# Patient Record
Sex: MALE | Race: BLACK OR AFRICAN AMERICAN | Employment: UNEMPLOYED | URBAN - METROPOLITAN AREA
[De-identification: names, ages, dates, MRNs, and addresses within clinical notes are randomized per-mention and may not be internally consistent; named-entity substitution may affect disease eponyms.]

---

## 2020-07-16 ENCOUNTER — APPOINTMENT (OUTPATIENT)
Dept: GENERAL RADIOLOGY | Age: 60
DRG: 377 | End: 2020-07-16
Attending: EMERGENCY MEDICINE
Payer: SUBSIDIZED

## 2020-07-16 ENCOUNTER — HOSPITAL ENCOUNTER (INPATIENT)
Age: 60
LOS: 6 days | Discharge: HOME | DRG: 377 | End: 2020-07-22
Attending: EMERGENCY MEDICINE | Admitting: HOSPITALIST
Payer: SUBSIDIZED

## 2020-07-16 DIAGNOSIS — D64.9 SYMPTOMATIC ANEMIA: Primary | ICD-10-CM

## 2020-07-16 PROBLEM — R10.9 ABDOMINAL PAIN: Status: ACTIVE | Noted: 2020-07-16

## 2020-07-16 LAB
ALBUMIN SERPL-MCNC: 4.2 G/DL (ref 3.2–4.6)
ALBUMIN/GLOB SERPL: 0.9 {RATIO} (ref 1.2–3.5)
ALP SERPL-CCNC: 80 U/L (ref 50–136)
ALT SERPL-CCNC: 47 U/L (ref 12–65)
ANION GAP SERPL CALC-SCNC: 12 MMOL/L (ref 7–16)
APTT PPP: 25 SEC (ref 24.3–35.4)
AST SERPL-CCNC: 57 U/L (ref 15–37)
BASOPHILS # BLD: 0.1 K/UL (ref 0–0.2)
BASOPHILS NFR BLD: 1 % (ref 0–2)
BILIRUB SERPL-MCNC: 0.4 MG/DL (ref 0.2–1.1)
BUN SERPL-MCNC: 17 MG/DL (ref 8–23)
CALCIUM SERPL-MCNC: 9.1 MG/DL (ref 8.3–10.4)
CHLORIDE SERPL-SCNC: 103 MMOL/L (ref 98–107)
CO2 SERPL-SCNC: 20 MMOL/L (ref 21–32)
CREAT SERPL-MCNC: 1.44 MG/DL (ref 0.8–1.5)
DIFFERENTIAL METHOD BLD: ABNORMAL
EOSINOPHIL # BLD: 0.2 K/UL (ref 0–0.8)
EOSINOPHIL NFR BLD: 2 % (ref 0.5–7.8)
ERYTHROCYTE [DISTWIDTH] IN BLOOD BY AUTOMATED COUNT: 23 % (ref 11.9–14.6)
ETHANOL SERPL-MCNC: 365 MG/DL
GLOBULIN SER CALC-MCNC: 4.8 G/DL (ref 2.3–3.5)
GLUCOSE SERPL-MCNC: 106 MG/DL (ref 65–100)
HCT VFR BLD AUTO: 21.5 % (ref 41.1–50.3)
HGB BLD-MCNC: 5.9 G/DL (ref 13.6–17.2)
IMM GRANULOCYTES # BLD AUTO: 0 K/UL (ref 0–0.5)
IMM GRANULOCYTES NFR BLD AUTO: 0 % (ref 0–5)
INR PPP: 1
LIPASE SERPL-CCNC: 398 U/L (ref 73–393)
LYMPHOCYTES # BLD: 3.4 K/UL (ref 0.5–4.6)
LYMPHOCYTES NFR BLD: 39 % (ref 13–44)
MAGNESIUM SERPL-MCNC: 2.5 MG/DL (ref 1.8–2.4)
MCH RBC QN AUTO: 16 PG (ref 26.1–32.9)
MCHC RBC AUTO-ENTMCNC: 27.4 G/DL (ref 31.4–35)
MCV RBC AUTO: 58.3 FL (ref 79.6–97.8)
MONOCYTES # BLD: 1 K/UL (ref 0.1–1.3)
MONOCYTES NFR BLD: 11 % (ref 4–12)
NEUTS SEG # BLD: 4 K/UL (ref 1.7–8.2)
NEUTS SEG NFR BLD: 47 % (ref 43–78)
NRBC # BLD: 0.04 K/UL (ref 0–0.2)
PHOSPHATE SERPL-MCNC: 4 MG/DL (ref 2.3–3.7)
PLATELET # BLD AUTO: 175 K/UL (ref 150–450)
PMV BLD AUTO: ABNORMAL FL (ref 9.4–12.3)
POTASSIUM SERPL-SCNC: 4 MMOL/L (ref 3.5–5.1)
PROT SERPL-MCNC: 9 G/DL (ref 6.3–8.2)
PROTHROMBIN TIME: 13.8 SEC (ref 12–14.7)
RBC # BLD AUTO: 3.69 M/UL (ref 4.23–5.6)
SODIUM SERPL-SCNC: 135 MMOL/L (ref 136–145)
TROPONIN-HIGH SENSITIVITY: 7.3 PG/ML (ref 0–14)
WBC # BLD AUTO: 8.7 K/UL (ref 4.3–11.1)

## 2020-07-16 PROCEDURE — 74011000258 HC RX REV CODE- 258: Performed by: HOSPITALIST

## 2020-07-16 PROCEDURE — 93005 ELECTROCARDIOGRAM TRACING: CPT | Performed by: EMERGENCY MEDICINE

## 2020-07-16 PROCEDURE — 65660000000 HC RM CCU STEPDOWN

## 2020-07-16 PROCEDURE — 84100 ASSAY OF PHOSPHORUS: CPT

## 2020-07-16 PROCEDURE — 83735 ASSAY OF MAGNESIUM: CPT

## 2020-07-16 PROCEDURE — 86923 COMPATIBILITY TEST ELECTRIC: CPT

## 2020-07-16 PROCEDURE — 99284 EMERGENCY DEPT VISIT MOD MDM: CPT

## 2020-07-16 PROCEDURE — 85025 COMPLETE CBC W/AUTO DIFF WBC: CPT

## 2020-07-16 PROCEDURE — 71046 X-RAY EXAM CHEST 2 VIEWS: CPT

## 2020-07-16 PROCEDURE — 85730 THROMBOPLASTIN TIME PARTIAL: CPT

## 2020-07-16 PROCEDURE — 83690 ASSAY OF LIPASE: CPT

## 2020-07-16 PROCEDURE — 74011250636 HC RX REV CODE- 250/636: Performed by: HOSPITALIST

## 2020-07-16 PROCEDURE — 84484 ASSAY OF TROPONIN QUANT: CPT

## 2020-07-16 PROCEDURE — 80053 COMPREHEN METABOLIC PANEL: CPT

## 2020-07-16 PROCEDURE — 85610 PROTHROMBIN TIME: CPT

## 2020-07-16 PROCEDURE — 74011000250 HC RX REV CODE- 250: Performed by: EMERGENCY MEDICINE

## 2020-07-16 PROCEDURE — 80307 DRUG TEST PRSMV CHEM ANLYZR: CPT

## 2020-07-16 PROCEDURE — C9113 INJ PANTOPRAZOLE SODIUM, VIA: HCPCS | Performed by: EMERGENCY MEDICINE

## 2020-07-16 PROCEDURE — 86900 BLOOD TYPING SEROLOGIC ABO: CPT

## 2020-07-16 PROCEDURE — 74011250636 HC RX REV CODE- 250/636: Performed by: EMERGENCY MEDICINE

## 2020-07-16 PROCEDURE — 65270000029 HC RM PRIVATE

## 2020-07-16 RX ORDER — SODIUM CHLORIDE 9 MG/ML
250 INJECTION, SOLUTION INTRAVENOUS AS NEEDED
Status: DISCONTINUED | OUTPATIENT
Start: 2020-07-16 | End: 2020-07-22 | Stop reason: HOSPADM

## 2020-07-16 RX ADMIN — SODIUM CHLORIDE 40 MG: 9 INJECTION, SOLUTION INTRAMUSCULAR; INTRAVENOUS; SUBCUTANEOUS at 23:21

## 2020-07-16 RX ADMIN — THIAMINE HYDROCHLORIDE 100 MG: 100 INJECTION, SOLUTION INTRAMUSCULAR; INTRAVENOUS at 23:23

## 2020-07-17 ENCOUNTER — ANESTHESIA EVENT (OUTPATIENT)
Dept: ENDOSCOPY | Age: 60
DRG: 377 | End: 2020-07-17
Payer: SUBSIDIZED

## 2020-07-17 ENCOUNTER — ANESTHESIA (OUTPATIENT)
Dept: ENDOSCOPY | Age: 60
DRG: 377 | End: 2020-07-17
Payer: SUBSIDIZED

## 2020-07-17 ENCOUNTER — APPOINTMENT (OUTPATIENT)
Dept: ULTRASOUND IMAGING | Age: 60
DRG: 377 | End: 2020-07-17
Attending: HOSPITALIST
Payer: SUBSIDIZED

## 2020-07-17 PROBLEM — F10.20 ALCOHOL DEPENDENCY (HCC): Status: ACTIVE | Noted: 2020-07-17

## 2020-07-17 PROBLEM — F10.929 ALCOHOL INTOXICATION (HCC): Status: ACTIVE | Noted: 2020-07-17

## 2020-07-17 LAB
ALBUMIN SERPL-MCNC: 3.6 G/DL (ref 3.2–4.6)
ALBUMIN/GLOB SERPL: 0.8 {RATIO} (ref 1.2–3.5)
ALP SERPL-CCNC: 72 U/L (ref 50–136)
ALT SERPL-CCNC: 43 U/L (ref 12–65)
ANION GAP SERPL CALC-SCNC: 10 MMOL/L (ref 7–16)
AST SERPL-CCNC: 49 U/L (ref 15–37)
ATRIAL RATE: 101 BPM
BASOPHILS # BLD: 0.1 K/UL (ref 0–0.2)
BASOPHILS NFR BLD: 1 % (ref 0–2)
BILIRUB SERPL-MCNC: 0.8 MG/DL (ref 0.2–1.1)
BUN SERPL-MCNC: 13 MG/DL (ref 8–23)
CALCIUM SERPL-MCNC: 8.9 MG/DL (ref 8.3–10.4)
CALCULATED P AXIS, ECG09: 62 DEGREES
CALCULATED R AXIS, ECG10: -11 DEGREES
CALCULATED T AXIS, ECG11: 48 DEGREES
CHLORIDE SERPL-SCNC: 110 MMOL/L (ref 98–107)
CO2 SERPL-SCNC: 21 MMOL/L (ref 21–32)
CREAT SERPL-MCNC: 1.23 MG/DL (ref 0.8–1.5)
DIAGNOSIS, 93000: NORMAL
DIFFERENTIAL METHOD BLD: ABNORMAL
EOSINOPHIL # BLD: 0.1 K/UL (ref 0–0.8)
EOSINOPHIL NFR BLD: 1 % (ref 0.5–7.8)
ERYTHROCYTE [DISTWIDTH] IN BLOOD BY AUTOMATED COUNT: 27.8 % (ref 11.9–14.6)
ERYTHROCYTE [DISTWIDTH] IN BLOOD BY AUTOMATED COUNT: 28.5 % (ref 11.9–14.6)
GLOBULIN SER CALC-MCNC: 4.3 G/DL (ref 2.3–3.5)
GLUCOSE SERPL-MCNC: 95 MG/DL (ref 65–100)
HCT VFR BLD AUTO: 25.2 % (ref 41.1–50.3)
HCT VFR BLD AUTO: 27.3 % (ref 41.1–50.3)
HGB BLD-MCNC: 7.4 G/DL (ref 13.6–17.2)
HGB BLD-MCNC: 8.1 G/DL (ref 13.6–17.2)
IMM GRANULOCYTES # BLD AUTO: 0 K/UL (ref 0–0.5)
IMM GRANULOCYTES NFR BLD AUTO: 0 % (ref 0–5)
LYMPHOCYTES # BLD: 1.6 K/UL (ref 0.5–4.6)
LYMPHOCYTES NFR BLD: 20 % (ref 13–44)
MCH RBC QN AUTO: 18.5 PG (ref 26.1–32.9)
MCH RBC QN AUTO: 18.6 PG (ref 26.1–32.9)
MCHC RBC AUTO-ENTMCNC: 29.4 G/DL (ref 31.4–35)
MCHC RBC AUTO-ENTMCNC: 29.7 G/DL (ref 31.4–35)
MCV RBC AUTO: 62.5 FL (ref 79.6–97.8)
MCV RBC AUTO: 63.3 FL (ref 79.6–97.8)
MONOCYTES # BLD: 0.9 K/UL (ref 0.1–1.3)
MONOCYTES NFR BLD: 12 % (ref 4–12)
NEUTS SEG # BLD: 5.2 K/UL (ref 1.7–8.2)
NEUTS SEG NFR BLD: 66 % (ref 43–78)
NRBC # BLD: 0.02 K/UL (ref 0–0.2)
NRBC # BLD: 0.02 K/UL (ref 0–0.2)
P-R INTERVAL, ECG05: 142 MS
PLATELET # BLD AUTO: 162 K/UL (ref 150–450)
PLATELET # BLD AUTO: 178 K/UL (ref 150–450)
PMV BLD AUTO: ABNORMAL FL (ref 9.4–12.3)
PMV BLD AUTO: ABNORMAL FL (ref 9.4–12.3)
POTASSIUM SERPL-SCNC: 4.2 MMOL/L (ref 3.5–5.1)
PROT SERPL-MCNC: 7.9 G/DL (ref 6.3–8.2)
Q-T INTERVAL, ECG07: 342 MS
QRS DURATION, ECG06: 84 MS
QTC CALCULATION (BEZET), ECG08: 443 MS
RBC # BLD AUTO: 3.98 M/UL (ref 4.23–5.6)
RBC # BLD AUTO: 4.37 M/UL (ref 4.23–5.6)
SODIUM SERPL-SCNC: 141 MMOL/L (ref 136–145)
VENTRICULAR RATE, ECG03: 101 BPM
WBC # BLD AUTO: 6.2 K/UL (ref 4.3–11.1)
WBC # BLD AUTO: 7.7 K/UL (ref 4.3–11.1)

## 2020-07-17 PROCEDURE — 36415 COLL VENOUS BLD VENIPUNCTURE: CPT

## 2020-07-17 PROCEDURE — 74011000250 HC RX REV CODE- 250: Performed by: NURSE ANESTHETIST, CERTIFIED REGISTERED

## 2020-07-17 PROCEDURE — 77030008969: Performed by: INTERNAL MEDICINE

## 2020-07-17 PROCEDURE — P9016 RBC LEUKOCYTES REDUCED: HCPCS

## 2020-07-17 PROCEDURE — 74011250636 HC RX REV CODE- 250/636: Performed by: HOSPITALIST

## 2020-07-17 PROCEDURE — 77030022875 HC PRB AR PLSM COAG ERBE -C: Performed by: INTERNAL MEDICINE

## 2020-07-17 PROCEDURE — 85025 COMPLETE CBC W/AUTO DIFF WBC: CPT

## 2020-07-17 PROCEDURE — 0W3P8ZZ CONTROL BLEEDING IN GASTROINTESTINAL TRACT, VIA NATURAL OR ARTIFICIAL OPENING ENDOSCOPIC: ICD-10-PCS | Performed by: INTERNAL MEDICINE

## 2020-07-17 PROCEDURE — 76705 ECHO EXAM OF ABDOMEN: CPT

## 2020-07-17 PROCEDURE — 85027 COMPLETE CBC AUTOMATED: CPT

## 2020-07-17 PROCEDURE — C9113 INJ PANTOPRAZOLE SODIUM, VIA: HCPCS | Performed by: HOSPITALIST

## 2020-07-17 PROCEDURE — 77030040361 HC SLV COMPR DVT MDII -B

## 2020-07-17 PROCEDURE — 76060000032 HC ANESTHESIA 0.5 TO 1 HR: Performed by: INTERNAL MEDICINE

## 2020-07-17 PROCEDURE — 74011250636 HC RX REV CODE- 250/636: Performed by: NURSE ANESTHETIST, CERTIFIED REGISTERED

## 2020-07-17 PROCEDURE — 80053 COMPREHEN METABOLIC PANEL: CPT

## 2020-07-17 PROCEDURE — 74011000258 HC RX REV CODE- 258: Performed by: HOSPITALIST

## 2020-07-17 PROCEDURE — 74011250637 HC RX REV CODE- 250/637: Performed by: INTERNAL MEDICINE

## 2020-07-17 PROCEDURE — 76040000025: Performed by: INTERNAL MEDICINE

## 2020-07-17 PROCEDURE — 36430 TRANSFUSION BLD/BLD COMPNT: CPT

## 2020-07-17 PROCEDURE — 65660000000 HC RM CCU STEPDOWN

## 2020-07-17 PROCEDURE — 30233N1 TRANSFUSION OF NONAUTOLOGOUS RED BLOOD CELLS INTO PERIPHERAL VEIN, PERCUTANEOUS APPROACH: ICD-10-PCS | Performed by: EMERGENCY MEDICINE

## 2020-07-17 RX ORDER — PROPOFOL 10 MG/ML
INJECTION, EMULSION INTRAVENOUS AS NEEDED
Status: DISCONTINUED | OUTPATIENT
Start: 2020-07-17 | End: 2020-07-17 | Stop reason: HOSPADM

## 2020-07-17 RX ORDER — SODIUM CHLORIDE 0.9 % (FLUSH) 0.9 %
5-40 SYRINGE (ML) INJECTION AS NEEDED
Status: DISCONTINUED | OUTPATIENT
Start: 2020-07-17 | End: 2020-07-22 | Stop reason: HOSPADM

## 2020-07-17 RX ORDER — PROPOFOL 10 MG/ML
INJECTION, EMULSION INTRAVENOUS
Status: DISCONTINUED | OUTPATIENT
Start: 2020-07-17 | End: 2020-07-17 | Stop reason: HOSPADM

## 2020-07-17 RX ORDER — DEXTROSE MONOHYDRATE AND SODIUM CHLORIDE 5; .9 G/100ML; G/100ML
125 INJECTION, SOLUTION INTRAVENOUS CONTINUOUS
Status: DISCONTINUED | OUTPATIENT
Start: 2020-07-17 | End: 2020-07-17

## 2020-07-17 RX ORDER — SODIUM CHLORIDE 0.9 % (FLUSH) 0.9 %
5-40 SYRINGE (ML) INJECTION EVERY 8 HOURS
Status: DISCONTINUED | OUTPATIENT
Start: 2020-07-17 | End: 2020-07-22 | Stop reason: HOSPADM

## 2020-07-17 RX ORDER — GLYCOPYRROLATE 0.2 MG/ML
INJECTION INTRAMUSCULAR; INTRAVENOUS AS NEEDED
Status: DISCONTINUED | OUTPATIENT
Start: 2020-07-17 | End: 2020-07-17 | Stop reason: HOSPADM

## 2020-07-17 RX ORDER — FOLIC ACID 1 MG/1
1 TABLET ORAL DAILY
Status: DISCONTINUED | OUTPATIENT
Start: 2020-07-18 | End: 2020-07-22 | Stop reason: HOSPADM

## 2020-07-17 RX ORDER — LIDOCAINE HYDROCHLORIDE 20 MG/ML
INJECTION, SOLUTION EPIDURAL; INFILTRATION; INTRACAUDAL; PERINEURAL AS NEEDED
Status: DISCONTINUED | OUTPATIENT
Start: 2020-07-17 | End: 2020-07-17 | Stop reason: HOSPADM

## 2020-07-17 RX ORDER — LORAZEPAM 2 MG/ML
1 INJECTION INTRAMUSCULAR
Status: DISCONTINUED | OUTPATIENT
Start: 2020-07-17 | End: 2020-07-22 | Stop reason: HOSPADM

## 2020-07-17 RX ORDER — LANOLIN ALCOHOL/MO/W.PET/CERES
100 CREAM (GRAM) TOPICAL DAILY
Status: DISCONTINUED | OUTPATIENT
Start: 2020-07-18 | End: 2020-07-22 | Stop reason: HOSPADM

## 2020-07-17 RX ORDER — PANTOPRAZOLE SODIUM 40 MG/1
40 TABLET, DELAYED RELEASE ORAL
Status: DISCONTINUED | OUTPATIENT
Start: 2020-07-17 | End: 2020-07-22 | Stop reason: HOSPADM

## 2020-07-17 RX ORDER — ONDANSETRON 2 MG/ML
4 INJECTION INTRAMUSCULAR; INTRAVENOUS
Status: DISCONTINUED | OUTPATIENT
Start: 2020-07-17 | End: 2020-07-22 | Stop reason: HOSPADM

## 2020-07-17 RX ORDER — SODIUM CHLORIDE, SODIUM LACTATE, POTASSIUM CHLORIDE, CALCIUM CHLORIDE 600; 310; 30; 20 MG/100ML; MG/100ML; MG/100ML; MG/100ML
INJECTION, SOLUTION INTRAVENOUS
Status: DISCONTINUED | OUTPATIENT
Start: 2020-07-17 | End: 2020-07-17 | Stop reason: HOSPADM

## 2020-07-17 RX ADMIN — Medication 10 ML: at 01:40

## 2020-07-17 RX ADMIN — Medication 10 ML: at 06:00

## 2020-07-17 RX ADMIN — LIDOCAINE HYDROCHLORIDE 90 MG: 20 INJECTION, SOLUTION EPIDURAL; INFILTRATION; INTRACAUDAL; PERINEURAL at 12:45

## 2020-07-17 RX ADMIN — PROPOFOL 40 MG: 10 INJECTION, EMULSION INTRAVENOUS at 13:04

## 2020-07-17 RX ADMIN — SODIUM CHLORIDE, SODIUM LACTATE, POTASSIUM CHLORIDE, AND CALCIUM CHLORIDE: 600; 310; 30; 20 INJECTION, SOLUTION INTRAVENOUS at 12:42

## 2020-07-17 RX ADMIN — Medication 10 ML: at 14:00

## 2020-07-17 RX ADMIN — PANTOPRAZOLE SODIUM 40 MG: 40 TABLET, DELAYED RELEASE ORAL at 16:40

## 2020-07-17 RX ADMIN — Medication 10 ML: at 21:28

## 2020-07-17 RX ADMIN — PANTOPRAZOLE SODIUM 40 MG: 40 INJECTION, POWDER, FOR SOLUTION INTRAVENOUS at 09:17

## 2020-07-17 RX ADMIN — PROPOFOL 80 MG: 10 INJECTION, EMULSION INTRAVENOUS at 12:45

## 2020-07-17 RX ADMIN — LORAZEPAM 1 MG: 2 INJECTION INTRAMUSCULAR; INTRAVENOUS at 16:47

## 2020-07-17 RX ADMIN — GLYCOPYRROLATE 0.1 MG: 0.2 INJECTION INTRAMUSCULAR; INTRAVENOUS at 12:45

## 2020-07-17 RX ADMIN — DEXTROSE MONOHYDRATE AND SODIUM CHLORIDE 125 ML/HR: 5; .9 INJECTION, SOLUTION INTRAVENOUS at 04:12

## 2020-07-17 RX ADMIN — PROPOFOL 50 MG: 10 INJECTION, EMULSION INTRAVENOUS at 12:48

## 2020-07-17 RX ADMIN — PROPOFOL 180 MCG/KG/MIN: 10 INJECTION, EMULSION INTRAVENOUS at 12:45

## 2020-07-17 NOTE — PROGRESS NOTES
TRANSFER - IN REPORT:    Verbal report received from Sola Gordon RN**(name) on Raúl Cameron  being received from ED(unit) for routine progression of care      Report consisted of patients Situation, Background, Assessment and   Recommendations(SBAR). Information from the following report(s) SBAR, Kardex, ED Summary, [facility] and Recent Results was reviewed with the receiving nurse. Opportunity for questions and clarification was provided. Assessment completed upon patients arrival to unit and care assumed.

## 2020-07-17 NOTE — PROGRESS NOTES
TRANSFER - IN REPORT:    Verbal report received from WOO Pedersen on Bennett Edgar  being received from GI lab for ordered procedure      Report consisted of patients Situation, Background, Assessment and   Recommendations(SBAR). Information from the following report(s) SBAR was reviewed with the receiving nurse. Opportunity for questions and clarification was provided. Assessment completed upon patients arrival to unit and care assumed.

## 2020-07-17 NOTE — ROUTINE PROCESS
TRANSFER - OUT REPORT:    Verbal report given to [facility] RN(name) on Steve Stock  being transferred to 60(unit) for routine post - op       Report consisted of patients Situation, Background, Assessment and   Recommendations(SBAR). Information from the following report(s) Kardex and Procedure Summary was reviewed with the receiving nurse. Lines:   Peripheral IV 07/16/20 Right Antecubital (Active)   Site Assessment Clean, dry, & intact 07/17/20 1231   Phlebitis Assessment 0 07/17/20 1231   Infiltration Assessment 0 07/17/20 1231   Dressing Status Clean, dry, & intact 07/17/20 1231   Dressing Type Transparent;Tape 07/17/20 1231   Hub Color/Line Status Pink; Infusing 07/17/20 1231   Alcohol Cap Used No 07/17/20 0708        Opportunity for questions and clarification was provided.

## 2020-07-17 NOTE — H&P
Hospitalist H&P/Consult Note     Admit Date:  2020  9:58 PM   Name:  Steve Stock   Age:  61 y.o.  :  1960   MRN:  [id number]   PCP:  Unknown, Provider  Treatment Team: Attending Provider: Alejandrina Coto MD    HPI:   Patient is a 60 y/o male with hx chronic, heavy daily alcohol consumption who presents to ED with progressive SETH and weakness. Also with complaint of epigastric abdominal discomfort and 'knot\" in abdomen. He drinks about 2-3 pints of liquor a day with beer chasers. Gets itchy and fidgety if doesn't drink daily. He has not had a medical exam in some time and no known medical diagnoses. Labs in ED show a severe microcytic anemia with hemoglobin of 5.9 and MCV 58. 3. no hx sickle cell or thallasemia. He denies overt GI bleeding or rosie melena. ED reported he was heme negative. BP was 115/71 with . PRBCs ordered for transfusion. He is fully lucid with a blood alcohol level of 365. No DTs at present. AST 57. Bilirubin and INR normal. Lipase is 398. Will admit for symptomatic anemia. He will be at high risk for developing alcohol withdrawal without adequate medication. 10 systems reviewed and negative except as noted in HPI. No past medical history on file. denies. No MD visit or check up  No past surgical history on file. denies  None     No Known Allergies   Social History     Tobacco Use    Smoking status: Current Every Day Smoker   Substance Use Topics    Alcohol use: Yes    drinks 2-3 pints liquor daily with beer chasers  Started drinking age 29    No family history on file. both parents alcoholics    There is no immunization history on file for this patient.     Objective:     Patient Vitals for the past 24 hrs:   Temp Pulse Resp BP SpO2   20 0217 98 °F (36.7 °C) 85 18 139/77 99 %   20 0154 98.3 °F (36.8 °C) 92 16 131/76 99 %   20 0134    115/71    20 0132    139/78    20 0130    148/73    20 0126 97.7 °F (36.5 °C) (!) 123 16 132/72 98 %   07/16/20 2356  84 16 135/68 100 %   07/16/20 2317  92 16 147/67 100 %   07/16/20 2154 97.9 °F (36.6 °C) 94 14 138/79 98 %     Oxygen Therapy  O2 Sat (%): 99 % (07/17/20 0217)  O2 Device: Room air (07/16/20 2356)  No intake or output data in the 24 hours ending 07/17/20 0252    Physical Exam:  General:    Well nourished. Alert. Eyes:   Normal sclera. Extraocular movements intact. ENT:  Normocephalic, atraumatic. Moist mucous membranes  CV:   RRR. No murmur, rub, or gallop. Lungs:  CTAB. No wheezing, rhonchi, or rales. Abdomen: Soft, nontender, nondistended. Bowel sounds normal.   Extremities: Warm and dry. No cyanosis or edema. Neurologic: CN II-XII grossly intact. Sensation intact. Skin:     No rashes or jaundice. No wounds. Psych:  Normal mood and affect. I reviewed the labs, imaging, EKGs, telemetry, and other studies done this admission.   Data Review:   Recent Results (from the past 24 hour(s))   EKG, 12 LEAD, INITIAL    Collection Time: 07/16/20  9:48 PM   Result Value Ref Range    Ventricular Rate 101 BPM    Atrial Rate 101 BPM    P-R Interval 142 ms    QRS Duration 84 ms    Q-T Interval 342 ms    QTC Calculation (Bezet) 443 ms    Calculated P Axis 62 degrees    Calculated R Axis -11 degrees    Calculated T Axis 48 degrees    Diagnosis       Sinus tachycardia  Otherwise normal ECG  No previous ECGs available     ETHYL ALCOHOL    Collection Time: 07/16/20  9:58 PM   Result Value Ref Range    ALCOHOL(ETHYL),SERUM 365 MG/DL   LIPASE    Collection Time: 07/16/20  9:58 PM   Result Value Ref Range    Lipase 398 (H) 73 - 393 U/L   PTT    Collection Time: 07/16/20  9:58 PM   Result Value Ref Range    aPTT 25.0 24.3 - 35.4 SEC   MAGNESIUM    Collection Time: 07/16/20  9:58 PM   Result Value Ref Range    Magnesium 2.5 (H) 1.8 - 2.4 mg/dL   PHOSPHORUS    Collection Time: 07/16/20  9:58 PM   Result Value Ref Range    Phosphorus 4.0 (H) 2.3 - 3.7 MG/DL   PROTHROMBIN TIME + INR Collection Time: 07/16/20  9:58 PM   Result Value Ref Range    Prothrombin time 13.8 12.0 - 14.7 sec    INR 1.0     CBC WITH AUTOMATED DIFF    Collection Time: 07/16/20  9:59 PM   Result Value Ref Range    WBC 8.7 4.3 - 11.1 K/uL    RBC 3.69 (L) 4.23 - 5.6 M/uL    HGB 5.9 (LL) 13.6 - 17.2 g/dL    HCT 21.5 (L) 41.1 - 50.3 %    MCV 58.3 (L) 79.6 - 97.8 FL    MCH 16.0 (L) 26.1 - 32.9 PG    MCHC 27.4 (L) 31.4 - 35.0 g/dL    RDW 23.0 (H) 11.9 - 14.6 %    PLATELET 939 719 - 870 K/uL    MPV Unable to calculate. Recommend adding IPF. 9.4 - 12.3 FL    ABSOLUTE NRBC 0.04 0.0 - 0.2 K/uL    NEUTROPHILS 47 43 - 78 %    LYMPHOCYTES 39 13 - 44 %    MONOCYTES 11 4.0 - 12.0 %    EOSINOPHILS 2 0.5 - 7.8 %    BASOPHILS 1 0.0 - 2.0 %    IMMATURE GRANULOCYTES 0 0.0 - 5.0 %    ABS. NEUTROPHILS 4.0 1.7 - 8.2 K/UL    ABS. LYMPHOCYTES 3.4 0.5 - 4.6 K/UL    ABS. MONOCYTES 1.0 0.1 - 1.3 K/UL    ABS. EOSINOPHILS 0.2 0.0 - 0.8 K/UL    ABS. BASOPHILS 0.1 0.0 - 0.2 K/UL    ABS. IMM. GRANS. 0.0 0.0 - 0.5 K/UL    DF AUTOMATED     METABOLIC PANEL, COMPREHENSIVE    Collection Time: 07/16/20  9:59 PM   Result Value Ref Range    Sodium 135 (L) 136 - 145 mmol/L    Potassium 4.0 3.5 - 5.1 mmol/L    Chloride 103 98 - 107 mmol/L    CO2 20 (L) 21 - 32 mmol/L    Anion gap 12 7 - 16 mmol/L    Glucose 106 (H) 65 - 100 mg/dL    BUN 17 8 - 23 MG/DL    Creatinine 1.44 0.8 - 1.5 MG/DL    GFR est AA >60 >60 ml/min/1.73m2    GFR est non-AA 53 (L) >60 ml/min/1.73m2    Calcium 9.1 8.3 - 10.4 MG/DL    Bilirubin, total 0.4 0.2 - 1.1 MG/DL    ALT (SGPT) 47 12 - 65 U/L    AST (SGOT) 57 (H) 15 - 37 U/L    Alk.  phosphatase 80 50 - 136 U/L    Protein, total 9.0 (H) 6.3 - 8.2 g/dL    Albumin 4.2 3.2 - 4.6 g/dL    Globulin 4.8 (H) 2.3 - 3.5 g/dL    A-G Ratio 0.9 (L) 1.2 - 3.5     TROPONIN-HIGH SENSITIVITY    Collection Time: 07/16/20  9:59 PM   Result Value Ref Range    Troponin-High Sensitivity 7.3 0 - 14 pg/mL   TYPE + CROSSMATCH    Collection Time: 07/16/20 10:50 PM Result Value Ref Range    Crossmatch Expiration 07/19/2020     ABO/Rh(D) A POSITIVE     Antibody screen NEG     Unit number [id number]     Blood component type  LR     Unit division 00     Status of unit ISSUED     Crossmatch result Compatible     Unit number [id number]     Blood component type  LR     Unit division 00     Status of unit ALLOCATED     Crossmatch result Compatible        Imaging Studies:  CXR Results  (Last 48 hours)               07/16/20 2228  XR CHEST PA LAT Final result    Impression:  IMPRESSION:    1. Clear lungs. 2. Enlarged cardiac silhouette. Narrative:  EXAM: Chest x-ray. INDICATION: Dyspnea. COMPARISON: None. TECHNIQUE: Frontal and lateral views of the chest were obtained. FINDINGS: The cardiac silhouette is enlarged. The lungs are clear. No   pneumothorax, vascular congestion or pleural effusion is seen. CT Results  (Last 48 hours)    None          Assessment and Plan:     Hospital Problems as of 7/17/2020 Never Reviewed          Codes Class Noted - Resolved POA    * (Principal) Severe anemia ICD-10-CM: D64.9  ICD-9-CM: 285.9  7/16/2020 - Present Yes        Abdominal pain ICD-10-CM: R10.9  ICD-9-CM: 789.00  7/16/2020 - Present Yes        Alcohol intoxication ([facility] [address] ICD-10-CM: F10.929  ICD-9-CM: 305.00  7/17/2020 - Present Yes        Alcohol dependency ([facility] [address] ICD-10-CM: F10.20  ICD-9-CM: 303.90  7/17/2020 - Present Yes              PLAN:  · Admit inpatient to remote telemetry  · With epigastric abdominal pain, chronic alcohol use, severe microcytic anemia would be concerned for erosive gastritis/esophagitis or PUD  · He has mild elevation of lipase so could be an element of pancreatitis as well  · Obtain abdominal US in am to evaluate pancreas and assess liver for cirrhosis  · Will transfuse PRBCs. Start with 2 units  · Place on BID IV protonix.  Monitor for GI bleed  · His INR normal so does not have coagulopathy  · GI consult in am for possible EGD (to assess for ulcer, mass, varices)  · IV fluids. Keep NPO for now  · Prn antiemetics and pain control  · Would be concerned that he could develop alcohol withdrawal/DTs  · Place on CIWA scale. Provide IV ativan  · Seizure precautions  · IV thiamine.  Give folate and MVI  · Place SCDs for DVT prophylaxis      Estimated LOS:  2 midnights    Signed:  Adriana Stone MD

## 2020-07-17 NOTE — OP NOTES
Gastroenterology Procedure Note              Procedure:  Esophagogastroduodenoscopy, Endoscopic ultrasound with Doppler     Date of Procedure:  7/17/2020    Patient:  Alejandra Montoya     1960    Indication:  Microcytic anemia, epigastric pain, elevated lipase     Sedation:  MAC    Pre-Procedure Physical Exam:    Mental status:  alert and oriented  Airway:  normal oropharyngeal airway and neck mobility  CV:  regular rate and rhythm  Respiratory:  clear to auscultation    Procedure:  A History and Physical has been performed, and patient medication allergies have been reviewed. Risks of perforation, hemorrhage, adverse drug reaction, and aspiration were discussed. Informed consent was obtained for the procedure, including sedation. The patient was placed in the left lateral decubitus position. The heart rate, oxygen saturations, blood pressure, and response to care were monitored throughout the procedure. The linear echoendoscope was passed through the mouth and advanced under direct vision to the distal duodenum. As the scope was slowly withdrawn, detailed endoscopic images were obtained from the surrounding organs. The patient tolerated the procedure well. The gastroscope was passed through the mouth and advanced under direct vision to the second portion of the duodenum. A careful inspection was made as the gastroscope was withdrawn, including a retroflexed view of the proximal stomach. The patient tolerated the procedure well. Findings:     ENDOSCOPIC FINDINGS:      OROPHARYNX: Cords and pyriform recesses appear normal.   ESOPHAGUS: The esophagus is normal. The proximal, mid, and distal portions are normal. The Z-Line is intact. STOMACH: A 4 mm angiodysplasia with a small amount of adherence blood is seen in the proximal gastric body. This was treated with APC as settings of 20 Fam 1 L/minute. DUODENUM:  Two 3-4 nonbleeding angiodysplasias are seen. These were treated with APC.  The ampulla is well-visualized endoscopically and appears normal.    PANCREAS:  The pancreas is well-visualized from head to tail. There are extensive pancreatic parenchymal changes including coarse lobularity, hyperechoic foci and hyperechoic stranding. No cysts or masses are visualized. The main pancreatic duct has an irregular contour and is dilated, measuring up to 4 mm in the head, 3 mm in the body and 2 mm in the tail. Pancreas divisum is not seen. BILIARY TREE: The gallbladder appears normal. The common bile duct is well-visualized from its insertion in the ampulla to the bifurcation of right and left hepatic ducts. It is non-dilated, measuring up to 6 mm maximally with a gradual taper down to the level of the ampulla. There are no intraductal stones, sludge or debris. The ampulla appears normal endosonographically. OTHER ORGANS:  Views of the left lobe of the liver demonstrate no solid mass lesions. Fatty infiltration of the liver. There is no ascites in the upper abdomen. The left adrenal gland appears normal. The major vascular structures including the portal vein, splenic vessels and celiac artery appear unremarkable. There are no pathologically enlarged posterior mediastinal or upper abdominal lymph nodes. Specimen:  No    Estimated Blood Loss:  Minimal    Implant:  None           Impression:    1. Gastric and duodenal angiodysplasias. Treated as outlined above. 2. Fatty infiltration of the liver. 3. Extensive pancreatic parenchymal and ductal abnormalities are consistent with mild chronic alcoholic pancreatitis. Plan:  1. Resume diet. 2. Protonix 40 mg twice daily, taken prior to breakfast and dinner. 3. Avoid NSAIDs. 4. I have counseled patient on the importance of abstinence from alcohol. 5. We will sign off, but do not hesitate to contact us for any additional assistance. We will arrange for a GI follow-up office visit in 3-4 weeks. Patient will be contacted by our office. Signed:  Charan Rosario MD  7/17/2020  9:30 AM

## 2020-07-17 NOTE — PROGRESS NOTES
Hospitalist Note     Admit Date:  2020  9:58 PM   Name:  Fadi Lora   Age:  61 y.o.  :  1960   MRN:  [id number]   PCP:  Unknown, Provider  Treatment Team: Attending Provider: Paula Boothe MD; Charge Nurse: Theo Dewitt, RN; Utilization Review: Garcia De La Torre RN; Care Manager: Isidro Gutierrez    HPI/Subjective:       Mr. Rakesh To is a 60 yo male with PMH of ETOH use (states 1.5 pint liquir plus 1.5 case beer daily), admitted with acute GI bleed/ acute blood loss anemia. HGB was 5. 9. he received PRBC. He has been seen by GI s/p EGD showing gastric / duodenal angiodysplasias s/p APC. In addition, he is managed for pancreatitis. ABD US no acute issues. Discharge plans pending to home.          20 currently hungry, no abd pain, no bleeding, no tremors, states he will continue to consume ETOH at discharge         Objective:     Patient Vitals for the past 24 hrs:   Temp Pulse Resp BP SpO2   20 1342  87 17 131/70 100 %   20 1332  90 16 120/66 100 %   20 1322  99 16 109/61 100 %   20 1312 98 °F (36.7 °C) 90 17 109/61 100 %   20 1224  87 18 121/71 99 %   20 1120 98.3 °F (36.8 °C) 93 18 131/66 98 %   20 0517 98.3 °F (36.8 °C) 79 15 116/66 95 %   20 0407 98 °F (36.7 °C) 83 16 128/74 98 %   20 0217 98 °F (36.7 °C) 85 18 139/77 99 %   20 0154 98.3 °F (36.8 °C) 92 16 131/76 99 %   20 0134    115/71    20 0132    139/78    20 0130    148/73    20 0126 97.7 °F (36.5 °C) (!) 123 16 132/72 98 %   20 2356  84 16 135/68 100 %   20 2317  92 16 147/67 100 %   20 2154 97.9 °F (36.6 °C) 94 14 138/79 98 %     Oxygen Therapy  O2 Sat (%): 100 % (20 1342)  Pulse via Oximetry: 87 beats per minute (20 1342)  O2 Device: Room air (20 1342)  O2 Flow Rate (L/min): 3 l/min (20 1312)    Estimated body mass index is 27.76 kg/m² as calculated from the following:    Height as of this encounter: 5' 3\" (1.6 m). Weight as of this encounter: 71.1 kg (156 lb 11.2 oz). Intake/Output Summary (Last 24 hours) at 7/17/2020 1541  Last data filed at 7/17/2020 1316  Gross per 24 hour   Intake 300 ml   Output 405 ml   Net -105 ml       *Note that automatically entered I/Os may not be accurate; dependent on patient compliance with collection and accurate  by techs. General:    Well nourished. Alert. No distress   CV:   RRR. No murmur, rub, or gallop. No edema  Lungs:   CTAB. No wheezing, rhonchi, or rales. anterior  Abdomen:   Soft, nontender, nondistended. Extremities: Warm and dry  Skin:     No rashes or jaundice.    Neuro:  No gross focal deficits    Data Reviewed:  I have reviewed all labs, meds, and studies from the last 24 hours:  Recent Results (from the past 24 hour(s))   EKG, 12 LEAD, INITIAL    Collection Time: 07/16/20  9:48 PM   Result Value Ref Range    Ventricular Rate 101 BPM    Atrial Rate 101 BPM    P-R Interval 142 ms    QRS Duration 84 ms    Q-T Interval 342 ms    QTC Calculation (Bezet) 443 ms    Calculated P Axis 62 degrees    Calculated R Axis -11 degrees    Calculated T Axis 48 degrees    Diagnosis       Sinus tachycardia  Otherwise normal ECG  No previous ECGs available  Confirmed by SAMRA WILLIAMSON (), NEAL KURTZ ([id number]) on 7/17/2020 6:23:58 AM     ETHYL ALCOHOL    Collection Time: 07/16/20  9:58 PM   Result Value Ref Range    ALCOHOL(ETHYL),SERUM 365 MG/DL   LIPASE    Collection Time: 07/16/20  9:58 PM   Result Value Ref Range    Lipase 398 (H) 73 - 393 U/L   PTT    Collection Time: 07/16/20  9:58 PM   Result Value Ref Range    aPTT 25.0 24.3 - 35.4 SEC   MAGNESIUM    Collection Time: 07/16/20  9:58 PM   Result Value Ref Range    Magnesium 2.5 (H) 1.8 - 2.4 mg/dL   PHOSPHORUS    Collection Time: 07/16/20  9:58 PM   Result Value Ref Range    Phosphorus 4.0 (H) 2.3 - 3.7 MG/DL   PROTHROMBIN TIME + INR    Collection Time: 07/16/20  9:58 PM Result Value Ref Range    Prothrombin time 13.8 12.0 - 14.7 sec    INR 1.0     CBC WITH AUTOMATED DIFF    Collection Time: 07/16/20  9:59 PM   Result Value Ref Range    WBC 8.7 4.3 - 11.1 K/uL    RBC 3.69 (L) 4.23 - 5.6 M/uL    HGB 5.9 (LL) 13.6 - 17.2 g/dL    HCT 21.5 (L) 41.1 - 50.3 %    MCV 58.3 (L) 79.6 - 97.8 FL    MCH 16.0 (L) 26.1 - 32.9 PG    MCHC 27.4 (L) 31.4 - 35.0 g/dL    RDW 23.0 (H) 11.9 - 14.6 %    PLATELET 527 739 - 970 K/uL    MPV Unable to calculate. Recommend adding IPF. 9.4 - 12.3 FL    ABSOLUTE NRBC 0.04 0.0 - 0.2 K/uL    NEUTROPHILS 47 43 - 78 %    LYMPHOCYTES 39 13 - 44 %    MONOCYTES 11 4.0 - 12.0 %    EOSINOPHILS 2 0.5 - 7.8 %    BASOPHILS 1 0.0 - 2.0 %    IMMATURE GRANULOCYTES 0 0.0 - 5.0 %    ABS. NEUTROPHILS 4.0 1.7 - 8.2 K/UL    ABS. LYMPHOCYTES 3.4 0.5 - 4.6 K/UL    ABS. MONOCYTES 1.0 0.1 - 1.3 K/UL    ABS. EOSINOPHILS 0.2 0.0 - 0.8 K/UL    ABS. BASOPHILS 0.1 0.0 - 0.2 K/UL    ABS. IMM. GRANS. 0.0 0.0 - 0.5 K/UL    DF AUTOMATED     METABOLIC PANEL, COMPREHENSIVE    Collection Time: 07/16/20  9:59 PM   Result Value Ref Range    Sodium 135 (L) 136 - 145 mmol/L    Potassium 4.0 3.5 - 5.1 mmol/L    Chloride 103 98 - 107 mmol/L    CO2 20 (L) 21 - 32 mmol/L    Anion gap 12 7 - 16 mmol/L    Glucose 106 (H) 65 - 100 mg/dL    BUN 17 8 - 23 MG/DL    Creatinine 1.44 0.8 - 1.5 MG/DL    GFR est AA >60 >60 ml/min/1.73m2    GFR est non-AA 53 (L) >60 ml/min/1.73m2    Calcium 9.1 8.3 - 10.4 MG/DL    Bilirubin, total 0.4 0.2 - 1.1 MG/DL    ALT (SGPT) 47 12 - 65 U/L    AST (SGOT) 57 (H) 15 - 37 U/L    Alk.  phosphatase 80 50 - 136 U/L    Protein, total 9.0 (H) 6.3 - 8.2 g/dL    Albumin 4.2 3.2 - 4.6 g/dL    Globulin 4.8 (H) 2.3 - 3.5 g/dL    A-G Ratio 0.9 (L) 1.2 - 3.5     TROPONIN-HIGH SENSITIVITY    Collection Time: 07/16/20  9:59 PM   Result Value Ref Range    Troponin-High Sensitivity 7.3 0 - 14 pg/mL   TYPE + CROSSMATCH    Collection Time: 07/16/20 10:50 PM   Result Value Ref Range    Crossmatch Expiration 07/19/2020     ABO/Rh(D) A POSITIVE     Antibody screen NEG     Unit number [id number]     Blood component type [facility]     Unit division 00     Status of unit ISSUED     Crossmatch result Compatible     Unit number [id number]     Blood component type [facility]     Unit division 00     Status of unit ALLOCATED     Crossmatch result Compatible    CBC W/O DIFF    Collection Time: 07/17/20  5:56 AM   Result Value Ref Range    WBC 6.2 4.3 - 11.1 K/uL    RBC 3.98 (L) 4.23 - 5.6 M/uL    HGB 7.4 (L) 13.6 - 17.2 g/dL    HCT 25.2 (L) 41.1 - 50.3 %    MCV 63.3 (L) 79.6 - 97.8 FL    MCH 18.6 (L) 26.1 - 32.9 PG    MCHC 29.4 (L) 31.4 - 35.0 g/dL    RDW 28.5 (H) 11.9 - 14.6 %    PLATELET 147 391 - 484 K/uL    MPV Unable to calculate. Recommend adding IPF. 9.4 - 12.3 FL    ABSOLUTE NRBC 0.02 0.0 - 0.2 K/uL   METABOLIC PANEL, COMPREHENSIVE    Collection Time: 07/17/20  5:56 AM   Result Value Ref Range    Sodium 141 136 - 145 mmol/L    Potassium 4.2 3.5 - 5.1 mmol/L    Chloride 110 (H) 98 - 107 mmol/L    CO2 21 21 - 32 mmol/L    Anion gap 10 7 - 16 mmol/L    Glucose 95 65 - 100 mg/dL    BUN 13 8 - 23 MG/DL    Creatinine 1.23 0.8 - 1.5 MG/DL    GFR est AA >60 >60 ml/min/1.73m2    GFR est non-AA >60 >60 ml/min/1.73m2    Calcium 8.9 8.3 - 10.4 MG/DL    Bilirubin, total 0.8 0.2 - 1.1 MG/DL    ALT (SGPT) 43 12 - 65 U/L    AST (SGOT) 49 (H) 15 - 37 U/L    Alk.  phosphatase 72 50 - 136 U/L    Protein, total 7.9 6.3 - 8.2 g/dL    Albumin 3.6 3.2 - 4.6 g/dL    Globulin 4.3 (H) 2.3 - 3.5 g/dL    A-G Ratio 0.8 (L) 1.2 - 3.5          Current Meds:  Current Facility-Administered Medications   Medication Dose Route Frequency    sodium chloride (NS) flush 5-40 mL  5-40 mL IntraVENous Q8H    sodium chloride (NS) flush 5-40 mL  5-40 mL IntraVENous PRN    ondansetron (ZOFRAN) injection 4 mg  4 mg IntraVENous Q4H PRN    LORazepam (ATIVAN) injection 1 mg  1 mg IntraVENous Q3H PRN    dextrose 5% and 0.9% NaCl infusion  125 mL/hr IntraVENous CONTINUOUS    pantoprazole (PROTONIX) tablet 40 mg  40 mg Oral ACB&D    0.9% sodium chloride infusion 250 mL  250 mL IntraVENous PRN    thiamine (B-1) 100 mg in 0.9% sodium chloride 50 mL IVPB  100 mg IntraVENous QHS       Other Studies:  No results found for this visit on 07/16/20. Xr Chest Pa Lat    Result Date: 7/16/2020  EXAM: Chest x-ray. INDICATION: Dyspnea. COMPARISON: None. TECHNIQUE: Frontal and lateral views of the chest were obtained. FINDINGS: The cardiac silhouette is enlarged. The lungs are clear. No pneumothorax, vascular congestion or pleural effusion is seen. IMPRESSION: 1. Clear lungs. 2. Enlarged cardiac silhouette.  [employer]    Result Date: 7/17/2020  RIGHT UPPER QUADRANT ULTRASOUND. HISTORY: Epigastric abdominal pain and elevated lipase. History chronic heavy ethanol use. COMPARISON: No relevant comparison studies available. FINDINGS: Ultrasonographic Tim's sign: is reported as negative. Gallstones: None . Gallbladder Wall: not thickened. Common Bile Duct: is not dilated, 5-6 mm. Intrahepatic Biliary Tree: is not dilated. Liver: Uniform parenchyma Included portion of the pancreas and right kidney: are unremarkable. IMPRESSION: Negative for gallstones or biliary tree obstruction.        All Micro Results     None          SARS-CoV-2 Lab Results  \"Novel Coronavirus\" Test: No results found for: COV2NT   \"Emergent Disease\" Test: No results found for: EDPR  \"SARS-COV-2\" Test: No results found for: XGCOVT         Assessment and Plan:     Hospital Problems as of 7/17/2020 Never Reviewed          Codes Class Noted - Resolved POA    Alcohol intoxication ([facility] [address] ICD-10-CM: R54.249  ICD-9-CM: 305.00  7/17/2020 - Present Yes        [facility] ICD-10-CM: F10.20  ICD-9-CM: 303.90  7/17/2020 - Present Yes        Abdominal pain ICD-10-CM: R10.9  ICD-9-CM: 789.00  7/16/2020 - Present Yes        * (Principal) Severe anemia ICD-10-CM: D64.9  ICD-9-CM: 285.9  7/16/2020 - Present Yes              Plan:  · GI bleed and acute blood loss anemia:  · Trend HGB, transfuse HGB < 7  · Continue protonix  · Full liquid diet       · Acute pancreatitis:  · stop IVF   · Has full liquid diet       · ETOH use:  · Needs cessation but not likely  · Add thiamine and folate  · Prn ativan   · followup for withdrawals  · Check Hepatitis and HIV panel     DC planning/Dispo:  pending      Diet:  DIET FULL LIQUID  DVT ppx:  SCD    Signed:  Bhupendra Andino MD

## 2020-07-17 NOTE — ANESTHESIA POSTPROCEDURE EVALUATION
Procedure(s):  ESOPHAGOGASTRODUODENOSCOPY (EGD)  ENDOSCOPIC ULTRASOUND (EUS) /Linear/ Room 608  ENDOSCOPIC ARGON PLASMA COAGULATION. total IV anesthesia    Anesthesia Post Evaluation        Patient location during evaluation: PACU  Patient participation: complete - patient participated  Level of consciousness: awake and alert  Pain management: adequate  Airway patency: patent  Anesthetic complications: no  Cardiovascular status: acceptable  Respiratory status: acceptable  Hydration status: acceptable  Post anesthesia nausea and vomiting:  none  Final Post Anesthesia Temperature Assessment:  Normothermia (36.0-37.5 degrees C)      INITIAL Post-op Vital signs:   Vitals Value Taken Time   /66 7/17/2020  1:32 PM   Temp 36.7 °C (98 °F) 7/17/2020  1:12 PM   Pulse 86 7/17/2020  1:40 PM   Resp 16 7/17/2020  1:32 PM   SpO2 100 % 7/17/2020  1:40 PM   Vitals shown include unvalidated device data.

## 2020-07-17 NOTE — PROGRESS NOTES
Hourly rounding completed on this shift. Pt c/o feeling like he going through with drawls. Pt given ativan per MAR. All needs met. Pt is currently resting in bed. Will continue to monitor and give report to oncoming nurse.

## 2020-07-17 NOTE — ED PROVIDER NOTES
44-year-old male presents with complaints of dyspnea lightheadedness fatigue and generalized malaise. Onset of symptoms greater than a week and progressively worse. Patient denies any significant past medical history  Does admit to heavy drinking on a daily basis  Patient is not had any vomiting or diarrhea. Patient denies hematochezia or melena    The history is provided by the patient. Chest Pain    This is a recurrent problem. The current episode started more than 2 days ago. The problem has not changed since onset. The problem occurs daily. The pain is associated with normal activity, breathing and movement. The pain is mild. The quality of the pain is described as dull. The symptoms are aggravated by exertion and deep breathing. Associated symptoms include dizziness, malaise/fatigue, nausea, shortness of breath and weakness. Pertinent negatives include no abdominal pain, no back pain, no cough, no diaphoresis, no fever, no headaches, no hemoptysis, no lower extremity edema, no numbness, no palpitations and no vomiting. He has tried nothing for the symptoms. Risk factors include male gender (Chronic alcoholism). His past medical history does not include DM or HTN. No past medical history on file. No past surgical history on file. No family history on file. Social History     Socioeconomic History    Marital status: SINGLE     Spouse name: Not on file    Number of children: Not on file    Years of education: Not on file    Highest education level: Not on file   Occupational History    Not on file   Social Needs    Financial resource strain: Not on file    Food insecurity     Worry: Not on file     Inability: Not on file    Transportation needs     Medical: Not on file     Non-medical: Not on file   Tobacco Use    Smoking status: Current Every Day Smoker   Substance and Sexual Activity    Alcohol use:  Yes    Drug use: Yes     Types: Cocaine    Sexual activity: Not on file Lifestyle    Physical activity     Days per week: Not on file     Minutes per session: Not on file    Stress: Not on file   Relationships    Social connections     Talks on phone: Not on file     Gets together: Not on file     Attends Yarsani service: Not on file     Active member of club or organization: Not on file     Attends meetings of clubs or organizations: Not on file     Relationship status: Not on file    Intimate partner violence     Fear of current or ex partner: Not on file     Emotionally abused: Not on file     Physically abused: Not on file     Forced sexual activity: Not on file   Other Topics Concern    Not on file   Social History Narrative    Not on file         ALLERGIES: Patient has no known allergies. Review of Systems   Constitutional: Positive for malaise/fatigue. Negative for activity change, chills, diaphoresis and fever. HENT: Negative for dental problem, hearing loss, nosebleeds, rhinorrhea and sore throat. Eyes: Negative for pain, discharge, redness and visual disturbance. Respiratory: Positive for shortness of breath. Negative for cough, hemoptysis and chest tightness. Cardiovascular: Positive for chest pain. Negative for palpitations and leg swelling. Gastrointestinal: Positive for nausea. Negative for abdominal pain, constipation, diarrhea and vomiting. Endocrine: Negative for cold intolerance, heat intolerance, polydipsia and polyuria. Genitourinary: Negative for dysuria and flank pain. Musculoskeletal: Negative for arthralgias, back pain, joint swelling, myalgias and neck pain. Skin: Negative for pallor and rash. Allergic/Immunologic: Negative for environmental allergies and food allergies. Neurological: Positive for dizziness and weakness. Negative for tremors, light-headedness, numbness and headaches. Hematological: Negative for adenopathy. Does not bruise/bleed easily. Psychiatric/Behavioral: Negative for confusion and dysphoric mood.  The patient is not nervous/anxious and is not hyperactive. All other systems reviewed and are negative. Vitals:    07/16/20 2154   BP: 138/79   Pulse: 94   Resp: 14   Temp: 97.9 °F (36.6 °C)   SpO2: 98%   Weight: 74.8 kg (165 lb)   Height: 5' 3\" (1.6 m)            Physical Exam  Vitals signs and nursing note reviewed. Constitutional:       General: He is in acute distress. Appearance: He is well-developed and normal weight. HENT:      Head: Normocephalic and atraumatic. Right Ear: External ear normal.      Left Ear: External ear normal.      Mouth/Throat:      Pharynx: No oropharyngeal exudate. Eyes:      General: No scleral icterus. Extraocular Movements: Extraocular movements intact. Conjunctiva/sclera: Conjunctivae normal.      Pupils: Pupils are equal, round, and reactive to light. Comments: Conjunctive are pale   Neck:      Musculoskeletal: Normal range of motion and neck supple. Thyroid: No thyromegaly. Vascular: No JVD. Cardiovascular:      Rate and Rhythm: Normal rate and regular rhythm. Heart sounds: Normal heart sounds. No murmur. No friction rub. No gallop. Pulmonary:      Effort: Pulmonary effort is normal. No respiratory distress. Breath sounds: Normal breath sounds. No wheezing. Abdominal:      General: Bowel sounds are normal. There is no distension. Palpations: Abdomen is soft. Tenderness: There is no abdominal tenderness. Genitourinary:     Rectum: Guaiac result negative. No mass, tenderness, anal fissure, external hemorrhoid or internal hemorrhoid. Normal anal tone. Musculoskeletal: Normal range of motion. General: No tenderness or deformity. Comments: Nailbeds are very pale   Skin:     General: Skin is warm and dry. Capillary Refill: Capillary refill takes less than 2 seconds. Findings: No rash. Neurological:      General: No focal deficit present.       Mental Status: He is alert and oriented to person, place, and time. Cranial Nerves: No cranial nerve deficit. Sensory: No sensory deficit. Motor: No abnormal muscle tone. Coordination: Coordination normal.   Psychiatric:         Mood and Affect: Affect is labile and blunt. Speech: Speech normal.         Behavior: Behavior normal. Behavior is cooperative. Thought Content: Thought content normal.         Cognition and Memory: He exhibits impaired recent memory. Judgment: Judgment normal.          MDM  Number of Diagnoses or Management Options  Alcoholism /alcohol abuse ([facility] [address]): established and worsening  Symptomatic anemia: new and requires workup  Diagnosis management comments: 10year-old male presents with complaints of exertional dyspnea and lightheadedness vague chest discomfort  Denies nausea vomiting or diarrhea  Denies melena or hematochezia    Patient is acutely intoxicated with a blood alcohol greater than 360. He is found to have a hemoglobin of 5.9  Unfortunately the patient has had no lab work in the system for access to any reports. Patient actually states he does remember ever having his blood drawn    Case is reviewed with the hospitalist service  They will admit       Amount and/or Complexity of Data Reviewed  Clinical lab tests: ordered and reviewed  Tests in the radiology section of CPT®: ordered and reviewed  Tests in the medicine section of CPT®: ordered and reviewed  Decide to obtain previous medical records or to obtain history from someone other than the patient: yes  Review and summarize past medical records: yes  Discuss the patient with other providers: yes  Independent visualization of images, tracings, or specimens: yes    Risk of Complications, Morbidity, and/or Mortality  Presenting problems: high  Diagnostic procedures: moderate  Management options: moderate  General comments: Elements of this note have been dictated via voice recognition software.   Text and phrases may be limited by the accuracy of the software. The chart has been reviewed, but errors may still be present.       Patient Progress  Patient progress: stable         Procedures

## 2020-07-17 NOTE — PROGRESS NOTES
TRANSFER - OUT REPORT:    Verbal report given to WOO Nino on Deshawn Ponce  being transferred to GI lab for ordered procedure       Report consisted of patients Situation, Background, Assessment and   Recommendations(SBAR). Information from the following report(s) SBAR was reviewed with the receiving nurse. Lines:   Peripheral IV 07/16/20 Right Antecubital (Active)   Site Assessment Clean, dry, & intact 07/17/20 0517   Phlebitis Assessment 0 07/17/20 0517   Infiltration Assessment 0 07/17/20 0517   Dressing Status Clean, dry, & intact 07/17/20 0517   Dressing Type Tape;Transparent 07/17/20 0517   Hub Color/Line Status Pink; Infusing 07/17/20 0517        Opportunity for questions and clarification was provided.       Patient transported with:   [facility]

## 2020-07-17 NOTE — PROGRESS NOTES
07/17/20 0146   Dual Skin Pressure Injury Assessment   Dual Skin Pressure Injury Assessment WDL   Second Care Provider (Based on [address] [place] [address]) [facility] RN   Skin Integumentary   Skin Integumentary (WDL) WDL    Pressure  Injury Documentation No Pressure Injury Noted-Pressure Ulcer Prevention Initiated   Wound Prevention and Protection Methods   Orientation of Wound Prevention Proximal   Location of Wound Prevention Sacrum/Coccyx   Dressing Present  No   Wound Offloading (Prevention Methods) Bed, pressure reduction mattress   Pt alert and oriented x 4 has old healed scar on left side of back, with bilateral severely dry feet.

## 2020-07-17 NOTE — PROGRESS NOTES
Hourly rounds performed all need met, pt. Tolerated 1 unit of transfused  RBC no concerns voiced. Bed locked in low position , call light within reach. Will continue to monitor care and give report to oncoming RN.

## 2020-07-17 NOTE — ANESTHESIA PREPROCEDURE EVALUATION
Relevant Problems   No relevant active problems       Anesthetic History               Review of Systems / Medical History  Patient summary reviewed and pertinent labs reviewed    Pulmonary                   Neuro/Psych              Cardiovascular                  Exercise tolerance: >4 METS  Comments: Patient denies known CAD   GI/Hepatic/Renal                Endo/Other        Anemia (GI loss JOSÉ)     Other Findings   Comments: Alcohol abuse           Physical Exam    Airway  Mallampati: II  TM Distance: 4 - 6 cm  Neck ROM: normal range of motion   Mouth opening: Normal     Cardiovascular    Rhythm: regular           Dental  No notable dental hx       Pulmonary  Breath sounds clear to auscultation               Abdominal  GI exam deferred       Other Findings            Anesthetic Plan    ASA: 3  Anesthesia type: total IV anesthesia          Induction: Intravenous  Anesthetic plan and risks discussed with: Patient

## 2020-07-17 NOTE — PROGRESS NOTES
TRANSFER - IN REPORT:    Verbal report received from Aiden kincaid RN(name) on Melvin Aguero  being received from 6th floor(unit) for ordered procedure      Report consisted of patients Situation, Background, Assessment and   Recommendations(SBAR). Information from the following report(s) SBAR, Kardex, Intake/Output, MAR and Recent Results was reviewed with the receiving nurse. Opportunity for questions and clarification was provided. Assessment completed upon patients arrival to unit and care assumed.            Patient to be transported to the GI lab for EGD/EUS

## 2020-07-17 NOTE — CONSULTS
Gastroenterology Consultation Note          Date of consultation:  7/17/2020     Consulting physician:  Damian Harris. Vibha Montenegro M.D. Chief complaint:  Abdominal pain     History of Present Illness:  Patient is a 61 y.o. gentleman with history of heavy alcohol consumption admitted with dyspnea on exertion found with hemoglobin 5.9. MCV was 58. Hemoglobin has improved to 7.4 following transfusion. Patient describes epigastric aching pain over the past few days. This is associated with nausea but no vomiting, change in bowel habits or overt bleeding. He denies use of NSAIDs. He admits to consuming approximately 3 pints of liquor daily with beer chasers. In the past she has had alcohol withdrawal symptoms. Also of note he was found to have elevated lipase of 398. Ultrasound this morning was performed with results that are pending. PMH:  Heavy alcohol consumption     PSH:  No past surgical history on file.     Allergies:  No Known Allergies    Home Medications:  Prior to Admission medications    Not on [facility] Medications:  Current Facility-Administered Medications   Medication Dose Route Frequency    sodium chloride (NS) flush 5-40 mL  5-40 mL IntraVENous Q8H    sodium chloride (NS) flush 5-40 mL  5-40 mL IntraVENous PRN    ondansetron (ZOFRAN) injection 4 mg  4 mg IntraVENous Q4H PRN    LORazepam (ATIVAN) injection 1 mg  1 mg IntraVENous Q3H PRN    pantoprazole (PROTONIX) 40 mg in 0.9% sodium chloride 10 mL injection  40 mg IntraVENous BID    dextrose 5% and 0.9% NaCl infusion  125 mL/hr IntraVENous CONTINUOUS    0.9% sodium chloride infusion 250 mL  250 mL IntraVENous PRN    thiamine (B-1) 100 mg in 0.9% sodium chloride 50 mL IVPB  100 mg IntraVENous QHS       Social History:  Social History     Tobacco Use    Smoking status: Current Every Day Smoker   Substance Use Topics    Alcohol use: Yes     Pt denies any history of IV drug use, blood transfusions, tattoos, prophylactic antibiotics prior to dental work, cardiac stents, pacemaker placement, or vaccinations for Hep A or B. Family History:  No family history on file. Review of Systems:  A detailed 10 system ROS is obtained, with pertinent positives as listed above. All others are negative.     Physical Exam:   Vitals:  Visit Vitals  /66 (BP 1 Location: Left arm, BP Patient Position: At rest;Supine)   Pulse 79   Temp 98.3 °F (36.8 °C)   Resp 15   Ht 5' 3\" (1.6 m)   Wt 71.1 kg (156 lb 11.2 oz)   SpO2 95%   BMI 27.76 kg/m²       HEENT:  No scleral icterus, no oral ulcers    Heart:  Regular rate and rhythm, no murmurs  Lungs:  Clear to auscultation bilaterally, no accessory muscle use  Abdomen: Soft, mild epigastric tenderness without rebound or guarding, normoactive bowel sounds, no organomegaly  Rectal:  Deferred  Extremities:  No cyanosis or leg edema  Skin:  No rashes, no spider angiomas over the anterior chest wall  Neuro:  Awake, alert and oriented to person, place, and time  Lymphatic:  No cervical or supraclavicular adenopathy    Data:    Recent Results (from the past 24 hour(s))   EKG, 12 LEAD, INITIAL    Collection Time: 07/16/20  9:48 PM   Result Value Ref Range    Ventricular Rate 101 BPM    Atrial Rate 101 BPM    P-R Interval 142 ms    QRS Duration 84 ms    Q-T Interval 342 ms    QTC Calculation (Bezet) 443 ms    Calculated P Axis 62 degrees    Calculated R Axis -11 degrees    Calculated T Axis 48 degrees    Diagnosis       Sinus tachycardia  Otherwise normal ECG  No previous ECGs available  Confirmed by SAMRA WILLIAMSON (), NEAL KURTZ ([id number]) on 7/17/2020 6:23:58 AM     ETHYL ALCOHOL    Collection Time: 07/16/20  9:58 PM   Result Value Ref Range    ALCOHOL(ETHYL),SERUM 365 MG/DL   LIPASE    Collection Time: 07/16/20  9:58 PM   Result Value Ref Range    Lipase 398 (H) 73 - 393 U/L   PTT    Collection Time: 07/16/20  9:58 PM   Result Value Ref Range    aPTT 25.0 24.3 - 35.4 SEC   MAGNESIUM    Collection Time: 07/16/20  9:58 PM   Result Value Ref Range    Magnesium 2.5 (H) 1.8 - 2.4 mg/dL   PHOSPHORUS    Collection Time: 07/16/20  9:58 PM   Result Value Ref Range    Phosphorus 4.0 (H) 2.3 - 3.7 MG/DL   PROTHROMBIN TIME + INR    Collection Time: 07/16/20  9:58 PM   Result Value Ref Range    Prothrombin time 13.8 12.0 - 14.7 sec    INR 1.0     CBC WITH AUTOMATED DIFF    Collection Time: 07/16/20  9:59 PM   Result Value Ref Range    WBC 8.7 4.3 - 11.1 K/uL    RBC 3.69 (L) 4.23 - 5.6 M/uL    HGB 5.9 (LL) 13.6 - 17.2 g/dL    HCT 21.5 (L) 41.1 - 50.3 %    MCV 58.3 (L) 79.6 - 97.8 FL    MCH 16.0 (L) 26.1 - 32.9 PG    MCHC 27.4 (L) 31.4 - 35.0 g/dL    RDW 23.0 (H) 11.9 - 14.6 %    PLATELET 825 335 - 284 K/uL    MPV Unable to calculate. Recommend adding IPF. 9.4 - 12.3 FL    ABSOLUTE NRBC 0.04 0.0 - 0.2 K/uL    NEUTROPHILS 47 43 - 78 %    LYMPHOCYTES 39 13 - 44 %    MONOCYTES 11 4.0 - 12.0 %    EOSINOPHILS 2 0.5 - 7.8 %    BASOPHILS 1 0.0 - 2.0 %    IMMATURE GRANULOCYTES 0 0.0 - 5.0 %    ABS. NEUTROPHILS 4.0 1.7 - 8.2 K/UL    ABS. LYMPHOCYTES 3.4 0.5 - 4.6 K/UL    ABS. MONOCYTES 1.0 0.1 - 1.3 K/UL    ABS. EOSINOPHILS 0.2 0.0 - 0.8 K/UL    ABS. BASOPHILS 0.1 0.0 - 0.2 K/UL    ABS. IMM. GRANS. 0.0 0.0 - 0.5 K/UL    DF AUTOMATED     METABOLIC PANEL, COMPREHENSIVE    Collection Time: 07/16/20  9:59 PM   Result Value Ref Range    Sodium 135 (L) 136 - 145 mmol/L    Potassium 4.0 3.5 - 5.1 mmol/L    Chloride 103 98 - 107 mmol/L    CO2 20 (L) 21 - 32 mmol/L    Anion gap 12 7 - 16 mmol/L    Glucose 106 (H) 65 - 100 mg/dL    BUN 17 8 - 23 MG/DL    Creatinine 1.44 0.8 - 1.5 MG/DL    GFR est AA >60 >60 ml/min/1.73m2    GFR est non-AA 53 (L) >60 ml/min/1.73m2    Calcium 9.1 8.3 - 10.4 MG/DL    Bilirubin, total 0.4 0.2 - 1.1 MG/DL    ALT (SGPT) 47 12 - 65 U/L    AST (SGOT) 57 (H) 15 - 37 U/L    Alk.  phosphatase 80 50 - 136 U/L    Protein, total 9.0 (H) 6.3 - 8.2 g/dL    Albumin 4.2 3.2 - 4.6 g/dL    Globulin 4.8 (H) 2.3 - 3.5 g/dL    A-G Ratio 0.9 (L) 1.2 - 3.5     TROPONIN-HIGH SENSITIVITY    Collection Time: 07/16/20  9:59 PM   Result Value Ref Range    Troponin-High Sensitivity 7.3 0 - 14 pg/mL   TYPE + CROSSMATCH    Collection Time: 07/16/20 10:50 PM   Result Value Ref Range    Crossmatch Expiration 07/19/2020     ABO/Rh(D) A POSITIVE     Antibody screen NEG     Unit number [id number]     Blood component type  LR     Unit division 00     Status of unit ISSUED     Crossmatch result Compatible     Unit number [id number]     Blood component type [facility]     Unit division 00     Status of unit ALLOCATED     Crossmatch result Compatible    CBC W/O DIFF    Collection Time: 07/17/20  5:56 AM   Result Value Ref Range    WBC 6.2 4.3 - 11.1 K/uL    RBC 3.98 (L) 4.23 - 5.6 M/uL    HGB 7.4 (L) 13.6 - 17.2 g/dL    HCT 25.2 (L) 41.1 - 50.3 %    MCV 63.3 (L) 79.6 - 97.8 FL    MCH 18.6 (L) 26.1 - 32.9 PG    MCHC 29.4 (L) 31.4 - 35.0 g/dL    RDW 28.5 (H) 11.9 - 14.6 %    PLATELET 877 587 - 798 K/uL    MPV Unable to calculate. Recommend adding IPF. 9.4 - 12.3 FL    ABSOLUTE NRBC 0.02 0.0 - 0.2 K/uL   METABOLIC PANEL, COMPREHENSIVE    Collection Time: 07/17/20  5:56 AM   Result Value Ref Range    Sodium 141 136 - 145 mmol/L    Potassium 4.2 3.5 - 5.1 mmol/L    Chloride 110 (H) 98 - 107 mmol/L    CO2 21 21 - 32 mmol/L    Anion gap 10 7 - 16 mmol/L    Glucose 95 65 - 100 mg/dL    BUN 13 8 - 23 MG/DL    Creatinine 1.23 0.8 - 1.5 MG/DL    GFR est AA >60 >60 ml/min/1.73m2    GFR est non-AA >60 >60 ml/min/1.73m2    Calcium 8.9 8.3 - 10.4 MG/DL    Bilirubin, total 0.8 0.2 - 1.1 MG/DL    ALT (SGPT) 43 12 - 65 U/L    AST (SGOT) 49 (H) 15 - 37 U/L    Alk. phosphatase 72 50 - 136 U/L    Protein, total 7.9 6.3 - 8.2 g/dL    Albumin 3.6 3.2 - 4.6 g/dL    Globulin 4.3 (H) 2.3 - 3.5 g/dL    A-G Ratio 0.8 (L) 1.2 - 3.5         impression/Plan:       80-year-old gentleman with history of heavy alcohol abuse presenting with symptomatic anemia, severe microcytosis, epigastric pain and elevated pancreatic lipase.  The differential diagnosis includes upper GI pathology such as peptic ulcer disease, varices, esophagitis, or Celiac disease; also includes lower GI pathology such as diverticular disease, arteriovenous malformation, neoplasm or internal hemorrhoids. The epigastric pain raises concern for possible peptic ulcer disease. Given the elevated lipase, and heavy alcohol consumption, there may be a component of acute pancreatitis. Strong clinical suspicion for chronic alcoholic liver disease without clinical evidence of encephalopathy or coagulopathy. Ultrasound performed this morning with results that are pending for evaluation of the liver and biliary tree. 1. NPO for EGD and EUS today. 2. Protonix 40 mg twice daily, taken prior to breakfast and dinner. 3. Monitor hemoglobin trend. 4. Monitor for alcohol withdrawal symptoms. 5. Further recommendations will follow.      Signed:  Andrea Kwon MD  7/17/2020  9:06 AM

## 2020-07-17 NOTE — PROGRESS NOTES
Comprehensive Nutrition Assessment    Type and Reason for Visit: Positive nutrition screen    Nutrition Assessment:  Patient presented with weakness and found to have anemia. S/p EGD and EUS 7/17 that showed, gastric and duddenal angiodysplasias, fatty liver, and extensive pancreatic parenchymal consistent with chronic alcoholic pancreatitis. Met with patient who was a vague historian. He states he is unsure of weight loss. When asked, he states he usually weight ~170#, but is unsure when he last weighed this. He states that the last time he can remember being weighed was ~2 yrs ago. When questioning regarding usual diet he states he just nick-nacks. Upon further question he reports usually only eating once per day, usually a sandwich. He states that he tried his father's Ensure in the past. He agrees to try to drink them at least until diet advanced. Estimated Daily Nutrient Needs:  Energy (kcal):  3249-9484(02-65 kcal/kg CBW 71.1 kg)  Protein (g):  71-89(20% kcal)         Current Nutrition Therapies:   DIET FULL LIQUID    Anthropometric Measures:  · Height:  5' 3\" (160 cm)  · Current Body Wt:  71.1 kg (156 lb 12 oz)   · Usual Body Wt:  170 lb 6.7 oz     · % Change: - 8.3   · Body mass index is 27.76 kg/m². · BMI Categories:  Overweight (BMI 25.0-29. 9)       Nutrition Diagnosis:   · Unintended weight loss related to (inadequate oral intake) as evidenced by (patient reported diet recall, 88.3% weight loss)      Nutrition Interventions:   Food and/or Nutrient Delivery: (Advance diet as medically appropriate). Ensure Enlive included with FLD. Will consider continuing pending diet advance and PO trends    Goals:  Meet 75% needs within 7 days       Nutrition Monitoring and Evaluation:   Food/Nutrient Intake Outcomes: Food and nutrient intake    Discharge Planning:     Too soon to determine     [address], LD on 7/17/2020 at 3:43 PM  Contact: [phone]

## 2020-07-17 NOTE — PROGRESS NOTES
Care Management Interventions  PCP Verified by CM: No  Mode of Transport at Discharge: Self  Transition of Care Consult (CM Consult): Discharge Planning  Discharge Durable Medical Equipment: No  Physical Therapy Consult: No  Occupational Therapy Consult: No  Speech Therapy Consult: No  Current Support Network: Relative's Home  Confirm Follow Up Transport: Family  Discharge Location  Discharge Placement: Home with family assistance      Pt admitted to 6th floor for abd pain. CM met with pt to discuss CM needs & DCP. Pt is A&Ox4. Pt is indep at home with all ADLS. Pt lives with sister. Pt has no DME needs. Pt relies on sister for transport needs. Pt does not take any medications, not familiar with [employer]. CM to provide information for [email or web address] and [employer]. DCP home with sister. CM to continue to monitor.

## 2020-07-17 NOTE — ED TRIAGE NOTES
Pt arrives from street, mask in place, ambulating with normal gait, with c/o chest pain x several weeks, with tonight being worse. 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 Pt admits to drinking every day. Pt has no medical hx, no current medications. Pt unable to describe pain, states, \"I just been drinking, it's like 'whoosh sound'\". Cannot locate pain or describe any referred pain.

## 2020-07-17 NOTE — ED NOTES
TRANSFER - OUT REPORT:    Verbal report given to Drew Morris RN on Dior Shell  being transferred to Room #325 for routine progression of care       Report consisted of patients Situation, Background, Assessment and   Recommendations(SBAR). Information from the following report(s) SBAR, Kardex, ED Summary, [facility] and Recent Results was reviewed with the receiving nurse. Lines:   Peripheral IV 07/16/20 Right Antecubital (Active)   Site Assessment Clean, dry, & intact 07/16/20 2157   Phlebitis Assessment 0 07/16/20 2157   Infiltration Assessment 0 07/16/20 2157   Dressing Status Clean, dry, & intact 07/16/20 2157   Dressing Type Transparent 07/16/20 2157   Hub Color/Line Status Pink 07/16/20 2157        Opportunity for questions and clarification was provided.       Patient transported with:   Fluid-1

## 2020-07-18 LAB
ALBUMIN SERPL-MCNC: 3.6 G/DL (ref 3.2–4.6)
ALBUMIN/GLOB SERPL: 0.9 {RATIO} (ref 1.2–3.5)
ALP SERPL-CCNC: 71 U/L (ref 50–136)
ALT SERPL-CCNC: 36 U/L (ref 12–65)
ANION GAP SERPL CALC-SCNC: 9 MMOL/L (ref 7–16)
AST SERPL-CCNC: 41 U/L (ref 15–37)
BILIRUB SERPL-MCNC: 0.9 MG/DL (ref 0.2–1.1)
BUN SERPL-MCNC: 13 MG/DL (ref 8–23)
CALCIUM SERPL-MCNC: 9.2 MG/DL (ref 8.3–10.4)
CHLORIDE SERPL-SCNC: 106 MMOL/L (ref 98–107)
CO2 SERPL-SCNC: 23 MMOL/L (ref 21–32)
CREAT SERPL-MCNC: 1.1 MG/DL (ref 0.8–1.5)
GLOBULIN SER CALC-MCNC: 4.2 G/DL (ref 2.3–3.5)
GLUCOSE BLD STRIP.AUTO-MCNC: 114 MG/DL (ref 65–100)
GLUCOSE BLD STRIP.AUTO-MCNC: 142 MG/DL (ref 65–100)
GLUCOSE BLD STRIP.AUTO-MCNC: 158 MG/DL (ref 65–100)
GLUCOSE BLD STRIP.AUTO-MCNC: 166 MG/DL (ref 65–100)
GLUCOSE SERPL-MCNC: 87 MG/DL (ref 65–100)
HIV 1+2 AB+HIV1 P24 AG SERPL QL IA: NONREACTIVE
HIV12 RESULT COMMENT, HHIVC: ABNORMAL
POTASSIUM SERPL-SCNC: 4 MMOL/L (ref 3.5–5.1)
PROT SERPL-MCNC: 7.8 G/DL (ref 6.3–8.2)
SODIUM SERPL-SCNC: 138 MMOL/L (ref 136–145)

## 2020-07-18 PROCEDURE — 74011250637 HC RX REV CODE- 250/637: Performed by: INTERNAL MEDICINE

## 2020-07-18 PROCEDURE — 65660000000 HC RM CCU STEPDOWN

## 2020-07-18 PROCEDURE — 80053 COMPREHEN METABOLIC PANEL: CPT

## 2020-07-18 PROCEDURE — 74011250636 HC RX REV CODE- 250/636: Performed by: HOSPITALIST

## 2020-07-18 PROCEDURE — 80074 ACUTE HEPATITIS PANEL: CPT

## 2020-07-18 PROCEDURE — 36415 COLL VENOUS BLD VENIPUNCTURE: CPT

## 2020-07-18 PROCEDURE — 87389 HIV-1 AG W/HIV-1&-2 AB AG IA: CPT

## 2020-07-18 PROCEDURE — 82962 GLUCOSE BLOOD TEST: CPT

## 2020-07-18 RX ORDER — CHLORDIAZEPOXIDE HYDROCHLORIDE 25 MG/1
25 CAPSULE, GELATIN COATED ORAL 3 TIMES DAILY
Status: DISCONTINUED | OUTPATIENT
Start: 2020-07-18 | End: 2020-07-20

## 2020-07-18 RX ADMIN — LORAZEPAM 1 MG: 2 INJECTION INTRAMUSCULAR; INTRAVENOUS at 08:49

## 2020-07-18 RX ADMIN — CHLORDIAZEPOXIDE HYDROCHLORIDE 25 MG: 25 CAPSULE ORAL at 16:45

## 2020-07-18 RX ADMIN — Medication 10 ML: at 05:08

## 2020-07-18 RX ADMIN — PANTOPRAZOLE SODIUM 40 MG: 40 TABLET, DELAYED RELEASE ORAL at 16:45

## 2020-07-18 RX ADMIN — FOLIC ACID 1 MG: 1 TABLET ORAL at 08:43

## 2020-07-18 RX ADMIN — Medication 100 MG: at 09:00

## 2020-07-18 RX ADMIN — CHLORDIAZEPOXIDE HYDROCHLORIDE 25 MG: 25 CAPSULE ORAL at 11:08

## 2020-07-18 RX ADMIN — PANTOPRAZOLE SODIUM 40 MG: 40 TABLET, DELAYED RELEASE ORAL at 05:08

## 2020-07-18 RX ADMIN — Medication 10 ML: at 21:47

## 2020-07-18 RX ADMIN — Medication 10 ML: at 14:12

## 2020-07-18 RX ADMIN — LORAZEPAM 1 MG: 2 INJECTION INTRAMUSCULAR; INTRAVENOUS at 01:20

## 2020-07-18 RX ADMIN — CHLORDIAZEPOXIDE HYDROCHLORIDE 25 MG: 25 CAPSULE ORAL at 21:47

## 2020-07-18 NOTE — PROGRESS NOTES
Hospitalist Note     Admit Date:  2020  9:58 PM   Name:  Manuel Solomon   Age:  61 y.o.  :  1960   MRN:  [id number]   PCP:  Unknown, Provider  Treatment Team: Attending Provider: Forrest Galindo MD; Utilization Review: Gaila Siemens, RN; Care Manager: Ruiz Austin    HPI/Subjective:       Mr. Cecelia Pineda is a 60 yo male with PMH of ETOH use (states 1.5 pint liquor plus 1.5 case beer daily), admitted with acute GI bleed/ acute blood loss anemia/ pancreatitis. HGB was 5. 9. he received PRBC with improvement. He has been seen by GI s/p EGD showing gastric / duodenal angiodysplasias s/p APC. In addition, he is managed for pancreatitis. ABD US no acute issues. Discharge plans pending to home. 20 now states homeless, feels some shakes and withdrawals, diet tolerant, no rectal bleed, no abd pain        Objective:     Patient Vitals for the past 24 hrs:   Temp Pulse Resp BP SpO2   20 0824 98.2 °F (36.8 °C) (!) 117 20 125/50 98 %   20 0430 98.8 °F (37.1 °C) 95 18 127/69 100 %   20 0014 98.3 °F (36.8 °C) 96 20 131/75 100 %   20 2043  (!) 112      20 1953 98 °F (36.7 °C) (!) 105 20 130/76    20 1632 98 °F (36.7 °C) (!) 109 17 156/76 100 %   20 1342  87 17 131/70 100 %   20 1332  90 16 120/66 100 %   20 1322  99 16 109/61 100 %   20 1312 98 °F (36.7 °C) 90 17 109/61 100 %   20 1224  87 18 121/71 99 %   20 1120 98.3 °F (36.8 °C) 93 18 131/66 98 %     Oxygen Therapy  O2 Sat (%): 98 % (20 0824)  Pulse via Oximetry: 87 beats per minute (20 1342)  O2 Device: Room air (20 0430)  O2 Flow Rate (L/min): 3 l/min (20 1312)    Estimated body mass index is 28.56 kg/m² as calculated from the following:    Height as of this encounter: 5' 3\" (1.6 m). Weight as of this encounter: 73.1 kg (161 lb 3.2 oz).       Intake/Output Summary (Last 24 hours) at 2020 0101  Last data filed at 7/18/2020 0123  Gross per 24 hour   Intake 300 ml   Output 605 ml   Net -305 ml       *Note that automatically entered I/Os may not be accurate; dependent on patient compliance with collection and accurate  by techs. General:    Well nourished. Alert. No distress   CV:   RRR. No murmur, rub, or gallop. No edema  Lungs:   CTAB. No wheezing, rhonchi, or rales. anterior  Abdomen:   Soft, nontender, nondistended. Decreased BS  Extremities: Warm and dry  Skin:     No rashes or jaundice. Neuro:  No gross focal deficits, no obvious tremors    Data Reviewed:  I have reviewed all labs, meds, and studies from the last 24 hours:  Recent Results (from the past 24 hour(s))   CBC WITH AUTOMATED DIFF    Collection Time: 07/17/20  4:58 PM   Result Value Ref Range    WBC 7.7 4.3 - 11.1 K/uL    RBC 4.37 4.23 - 5.6 M/uL    HGB 8.1 (L) 13.6 - 17.2 g/dL    HCT 27.3 (L) 41.1 - 50.3 %    MCV 62.5 (L) 79.6 - 97.8 FL    MCH 18.5 (L) 26.1 - 32.9 PG    MCHC 29.7 (L) 31.4 - 35.0 g/dL    RDW 27.8 (H) 11.9 - 14.6 %    PLATELET 099 292 - 217 K/uL    MPV Unable to calculate. Recommend adding IPF. 9.4 - 12.3 FL    ABSOLUTE NRBC 0.02 0.0 - 0.2 K/uL    DF AUTOMATED      NEUTROPHILS 66 43 - 78 %    LYMPHOCYTES 20 13 - 44 %    MONOCYTES 12 4.0 - 12.0 %    EOSINOPHILS 1 0.5 - 7.8 %    BASOPHILS 1 0.0 - 2.0 %    IMMATURE GRANULOCYTES 0 0.0 - 5.0 %    ABS. NEUTROPHILS 5.2 1.7 - 8.2 K/UL    ABS. LYMPHOCYTES 1.6 0.5 - 4.6 K/UL    ABS. MONOCYTES 0.9 0.1 - 1.3 K/UL    ABS. EOSINOPHILS 0.1 0.0 - 0.8 K/UL    ABS. BASOPHILS 0.1 0.0 - 0.2 K/UL    ABS. IMM.  GRANS. 0.0 0.0 - 0.5 K/UL   METABOLIC PANEL, COMPREHENSIVE    Collection Time: 07/18/20  5:27 AM   Result Value Ref Range    Sodium 138 136 - 145 mmol/L    Potassium 4.0 3.5 - 5.1 mmol/L    Chloride 106 98 - 107 mmol/L    CO2 23 21 - 32 mmol/L    Anion gap 9 7 - 16 mmol/L    Glucose 87 65 - 100 mg/dL    BUN 13 8 - 23 MG/DL    Creatinine 1.10 0.8 - 1.5 MG/DL    GFR est AA >60 >60 ml/min/1.73m2 GFR est non-AA >60 >60 ml/min/1.73m2    Calcium 9.2 8.3 - 10.4 MG/DL    Bilirubin, total 0.9 0.2 - 1.1 MG/DL    ALT (SGPT) 36 12 - 65 U/L    AST (SGOT) 41 (H) 15 - 37 U/L    Alk. phosphatase 71 50 - 136 U/L    Protein, total 7.8 6.3 - 8.2 g/dL    Albumin 3.6 3.2 - 4.6 g/dL    Globulin 4.2 (H) 2.3 - 3.5 g/dL    A-G Ratio 0.9 (L) 1.2 - 3.5     HIV 1/2 AG/AB, 4TH GENERATION,W RFLX CONFIRM    Collection Time: 07/18/20  5:27 AM   Result Value Ref Range    HIV 1/2 Interpretation NONREACTIVE NR      HIV 1/2 result comment SEE NOTE (A) NR     GLUCOSE, POC    Collection Time: 07/18/20  7:06 AM   Result Value Ref Range    Glucose (POC) 114 (H) 65 - 100 mg/dL        Current Meds:  Current Facility-Administered Medications   Medication Dose Route Frequency    chlordiazePOXIDE (LIBRIUM) capsule 25 mg  25 mg Oral TID    sodium chloride (NS) flush 5-40 mL  5-40 mL IntraVENous Q8H    sodium chloride (NS) flush 5-40 mL  5-40 mL IntraVENous PRN    ondansetron (ZOFRAN) injection 4 mg  4 mg IntraVENous Q4H PRN    LORazepam (ATIVAN) injection 1 mg  1 mg IntraVENous Q3H PRN    pantoprazole (PROTONIX) tablet 40 mg  40 mg Oral ACB&D    folic acid (FOLVITE) tablet 1 mg  1 mg Oral DAILY    thiamine HCL (B-1) tablet 100 mg  100 mg Oral DAILY    0.9% sodium chloride infusion 250 mL  250 mL IntraVENous PRN       Other Studies:  No results found for this visit on 07/16/20. No results found.     All Micro Results     None          SARS-CoV-2 Lab Results  \"Novel Coronavirus\" Test: No results found for: COV2NT   \"Emergent Disease\" Test: No results found for: EDPR  \"SARS-COV-2\" Test: No results found for: XGCOVT         Assessment and Plan:     Hospital Problems as of 7/18/2020 Never Reviewed          Codes Class Noted - Resolved POA    Alcohol intoxication ([address] ICD-10-CM: S92.894  ICD-9-CM: 305.00  7/17/2020 - Present Yes        Alcohol dependency ([facility] ICD-10-CM: F10.20  ICD-9-CM: 303.90  7/17/2020 - Present Yes        Abdominal pain ICD-10-CM: R10.9  ICD-9-CM: 789.00  7/16/2020 - Present Yes        * (Principal) Severe anemia ICD-10-CM: D64.9  ICD-9-CM: 285.9  7/16/2020 - Present Yes              Plan:  · GI bleed and acute blood loss anemia:  · Trend HGB, transfuse HGB < 7  · Continue protonix  · Full liquid diet to advance      · Acute pancreatitis:  · stopped IVF   · advance diet       · ETOH use and delirium tremens:  · Needs cessation but not likely- he states that he would continue to consume though currently homeless  · Seems to be in early withdrawals and will continue inpatient care with librium every 8 hours and ativan prn   · continued thiamine and folate  · Pending Hepatitis  panel     DC planning/Dispo:  pending      Diet:  DIET FULL LIQUID  DVT ppx:  SCD    Signed:  Shannan Yanes MD

## 2020-07-18 NOTE — PROGRESS NOTES
Problem: Falls - Risk of  Goal: *Absence of Falls  Description: Document Briana Castilloshire Fall Risk and appropriate interventions in the flowsheet.   Outcome: Progressing Towards Goal  Note: Fall Risk Interventions:  Mobility Interventions: Patient to call before getting OOB         Medication Interventions: Patient to call before getting OOB    Elimination Interventions: Patient to call for help with toileting needs    History of Falls Interventions: Door open when patient unattended         Problem: Upper and Lower GI Bleed: Day 2  Goal: Activity/Safety  Outcome: Progressing Towards Goal  Goal: *Optimal pain control at patient's stated goal  Outcome: Progressing Towards Goal

## 2020-07-18 NOTE — PROGRESS NOTES
Date 07/17/20 0700 - 07/18/20 0659 07/18/20 0700 - 07/19/20 0659   Shift 0700-1859 1900-0659 24 Hour Total 5381-0585 3793-7303 24 Hour Total   INTAKE   I.V.(mL/kg/hr) 300(0.4)  300        Volume (lactated Ringers infusion) 300  300      Shift Total(mL/kg) 300(4.2)  300(4.1)      OUTPUT   Urine(mL/kg/hr) 400(0.5) 600 1000        Urine Voided         Urine Occurrence(s) 2 x 2 x 4 x        Urine Output 0  0      Blood 5  5        Estimated Blood Loss 5  5      Shift Total(mL/kg) 405(5.7) 600(8.2) 1005(13.7)      NET -105 -600 -705      Weight (kg) 71.1 73.1 73.1 73.1 73.1 73.1     Hourly rounds done. Pt c/o withdrawals/tremors, medicated per MAR. Denies pain, nausea, vomiting. Cardiac monitor running NSR to S [place]. All needs met at this time.

## 2020-07-19 LAB
ANION GAP SERPL CALC-SCNC: 10 MMOL/L (ref 7–16)
BASOPHILS # BLD: 0 K/UL (ref 0–0.2)
BASOPHILS NFR BLD: 0 % (ref 0–2)
BUN SERPL-MCNC: 12 MG/DL (ref 8–23)
CALCIUM SERPL-MCNC: 9 MG/DL (ref 8.3–10.4)
CHLORIDE SERPL-SCNC: 102 MMOL/L (ref 98–107)
CO2 SERPL-SCNC: 23 MMOL/L (ref 21–32)
CREAT SERPL-MCNC: 1.31 MG/DL (ref 0.8–1.5)
DIFFERENTIAL METHOD BLD: ABNORMAL
EOSINOPHIL # BLD: 0.2 K/UL (ref 0–0.8)
EOSINOPHIL NFR BLD: 2 % (ref 0.5–7.8)
ERYTHROCYTE [DISTWIDTH] IN BLOOD BY AUTOMATED COUNT: 27.9 % (ref 11.9–14.6)
EST. AVERAGE GLUCOSE BLD GHB EST-MCNC: 134 MG/DL
GLUCOSE BLD STRIP.AUTO-MCNC: 119 MG/DL (ref 65–100)
GLUCOSE BLD STRIP.AUTO-MCNC: 126 MG/DL (ref 65–100)
GLUCOSE BLD STRIP.AUTO-MCNC: 140 MG/DL (ref 65–100)
GLUCOSE BLD STRIP.AUTO-MCNC: 146 MG/DL (ref 65–100)
GLUCOSE SERPL-MCNC: 125 MG/DL (ref 65–100)
HAV IGM SERPL QL IA: NEGATIVE
HBA1C MFR BLD: 6.3 % (ref 4.8–6)
HBV CORE IGM SERPL QL IA: NEGATIVE
HBV SURFACE AG SERPL QL IA: NEGATIVE
HCT VFR BLD AUTO: 27.1 % (ref 41.1–50.3)
HCV AB S/CO SERPL IA: <0.1 S/CO RATIO (ref 0–0.9)
HGB BLD-MCNC: 7.8 G/DL (ref 13.6–17.2)
IMM GRANULOCYTES # BLD AUTO: 0 K/UL (ref 0–0.5)
IMM GRANULOCYTES NFR BLD AUTO: 0 % (ref 0–5)
LYMPHOCYTES # BLD: 2.4 K/UL (ref 0.5–4.6)
LYMPHOCYTES NFR BLD: 25 % (ref 13–44)
MAGNESIUM SERPL-MCNC: 2.1 MG/DL (ref 1.8–2.4)
MCH RBC QN AUTO: 18.1 PG (ref 26.1–32.9)
MCHC RBC AUTO-ENTMCNC: 28.8 G/DL (ref 31.4–35)
MCV RBC AUTO: 62.9 FL (ref 79.6–97.8)
MONOCYTES # BLD: 1 K/UL (ref 0.1–1.3)
MONOCYTES NFR BLD: 11 % (ref 4–12)
NEUTS SEG # BLD: 5.9 K/UL (ref 1.7–8.2)
NEUTS SEG NFR BLD: 62 % (ref 43–78)
NRBC # BLD: 0.02 K/UL (ref 0–0.2)
PLATELET # BLD AUTO: 174 K/UL (ref 150–450)
PMV BLD AUTO: ABNORMAL FL (ref 9.4–12.3)
POTASSIUM SERPL-SCNC: 3.4 MMOL/L (ref 3.5–5.1)
RBC # BLD AUTO: 4.31 M/UL (ref 4.23–5.6)
SODIUM SERPL-SCNC: 135 MMOL/L (ref 136–145)
WBC # BLD AUTO: 9.6 K/UL (ref 4.3–11.1)

## 2020-07-19 PROCEDURE — 74011250637 HC RX REV CODE- 250/637: Performed by: INTERNAL MEDICINE

## 2020-07-19 PROCEDURE — 85025 COMPLETE CBC W/AUTO DIFF WBC: CPT

## 2020-07-19 PROCEDURE — 83036 HEMOGLOBIN GLYCOSYLATED A1C: CPT

## 2020-07-19 PROCEDURE — 36415 COLL VENOUS BLD VENIPUNCTURE: CPT

## 2020-07-19 PROCEDURE — 80048 BASIC METABOLIC PNL TOTAL CA: CPT

## 2020-07-19 PROCEDURE — 82962 GLUCOSE BLOOD TEST: CPT

## 2020-07-19 PROCEDURE — 74011250636 HC RX REV CODE- 250/636: Performed by: HOSPITALIST

## 2020-07-19 PROCEDURE — 65660000000 HC RM CCU STEPDOWN

## 2020-07-19 PROCEDURE — 83735 ASSAY OF MAGNESIUM: CPT

## 2020-07-19 RX ORDER — POTASSIUM CHLORIDE 20 MEQ/1
40 TABLET, EXTENDED RELEASE ORAL
Status: COMPLETED | OUTPATIENT
Start: 2020-07-19 | End: 2020-07-19

## 2020-07-19 RX ADMIN — CHLORDIAZEPOXIDE HYDROCHLORIDE 25 MG: 25 CAPSULE ORAL at 21:54

## 2020-07-19 RX ADMIN — Medication 10 ML: at 21:54

## 2020-07-19 RX ADMIN — POTASSIUM CHLORIDE 40 MEQ: 20 TABLET, EXTENDED RELEASE ORAL at 14:28

## 2020-07-19 RX ADMIN — PANTOPRAZOLE SODIUM 40 MG: 40 TABLET, DELAYED RELEASE ORAL at 06:05

## 2020-07-19 RX ADMIN — Medication 10 ML: at 14:28

## 2020-07-19 RX ADMIN — LORAZEPAM 1 MG: 2 INJECTION INTRAMUSCULAR; INTRAVENOUS at 06:12

## 2020-07-19 RX ADMIN — Medication 100 MG: at 09:00

## 2020-07-19 RX ADMIN — CHLORDIAZEPOXIDE HYDROCHLORIDE 25 MG: 25 CAPSULE ORAL at 09:20

## 2020-07-19 RX ADMIN — PANTOPRAZOLE SODIUM 40 MG: 40 TABLET, DELAYED RELEASE ORAL at 17:17

## 2020-07-19 RX ADMIN — CHLORDIAZEPOXIDE HYDROCHLORIDE 25 MG: 25 CAPSULE ORAL at 17:18

## 2020-07-19 RX ADMIN — FOLIC ACID 1 MG: 1 TABLET ORAL at 09:20

## 2020-07-19 RX ADMIN — Medication 10 ML: at 06:05

## 2020-07-19 NOTE — PROGRESS NOTES
Hospitalist Note     Admit Date:  2020  9:58 PM   Name:  Angela Fajardo   Age:  61 y.o.  :  1960   MRN:  [id number]   PCP:  Unknown, Provider  Treatment Team: Attending Provider: Lali Perez MD; Utilization Review: Praveen Fiore RN; Care Manager: Priscilla Moarles    HPI/Subjective:       Mr. Jojo Jenkins is a 62 yo male with PMH of ETOH use (states 1.5 pint liquor plus 1.5 case beer daily), admitted with acute GI bleed/ acute blood loss anemia/ pancreatitis. HGB was 5. 9. he received PRBC with improvement. He has been seen by GI s/p EGD showing gastric / duodenal angiodysplasias s/p APC. In addition, he is managed for pancreatitis. ABD US no acute issues. He developed some ETOH withdrawals and is managed with scheduled librium, prn ativan. Folate and thiamine added. Discharge plans [place] to [place]states homeless. 20  Getting as needed ativan for anxiety, ate ok, no rectal bleeding, sleeping fine, no dyspnea        Objective:     Patient Vitals for the past 24 hrs:   Temp Pulse Resp BP SpO2   20 0724 98.4 °F (36.9 °C) 92 19 121/78 99 %   20 0427 98.5 °F (36.9 °C) 94 16 128/74 (!) 20 %   20 2322 98.5 °F (36.9 °C) 84 16 125/66 99 %   20 1931 98.5 °F (36.9 °C) 89 16 129/69 99 %   20 1453 98.5 °F (36.9 °C) 98 16 112/70 100 %   20 1148 98.7 °F (37.1 °C) (!) 110 20 118/69 98 %     Oxygen Therapy  O2 Sat (%): 99 % (20 0724)  Pulse via Oximetry: 87 beats per minute (20 1342)  O2 Device: Room air (20 0430)  O2 Flow Rate (L/min): 3 l/min (20 1312)    Estimated body mass index is 28.71 kg/m² as calculated from the following:    Height as of this encounter: 5' 3\" (1.6 m). Weight as of this encounter: 73.5 kg (162 lb 1.6 oz).       Intake/Output Summary (Last 24 hours) at 2020 0946  Last data filed at 2020 1937  Gross per 24 hour   Intake    Output 200 ml   Net -200 ml       *Note that automatically entered I/Os may not be accurate; dependent on patient compliance with collection and accurate  by techs. General:    Well nourished. Alert. No distress   CV:   RRR. No murmur, rub, or gallop. No edema  Lungs:   CTAB. No wheezing, rhonchi, or rales  Abdomen:   Soft, nontender, nondistended. Decreased BS  Extremities: Warm and dry  Skin:     No rashes or jaundice. Neuro:  No gross focal deficits    Data Reviewed:  I have reviewed all labs, meds, and studies from the last 24 hours:  Recent Results (from the past 24 hour(s))   GLUCOSE, POC    Collection Time: 07/18/20 10:48 AM   Result Value Ref Range    Glucose (POC) 158 (H) 65 - 100 mg/dL   GLUCOSE, POC    Collection Time: 07/18/20  3:57 PM   Result Value Ref Range    Glucose (POC) 166 (H) 65 - 100 mg/dL   GLUCOSE, POC    Collection Time: 07/18/20  8:59 PM   Result Value Ref Range    Glucose (POC) 142 (H) 65 - 100 mg/dL   GLUCOSE, POC    Collection Time: 07/19/20  7:33 AM   Result Value Ref Range    Glucose (POC) 119 (H) 65 - 100 mg/dL        Current Meds:  Current Facility-Administered Medications   Medication Dose Route Frequency    chlordiazePOXIDE (LIBRIUM) capsule 25 mg  25 mg Oral TID    sodium chloride (NS) flush 5-40 mL  5-40 mL IntraVENous Q8H    sodium chloride (NS) flush 5-40 mL  5-40 mL IntraVENous PRN    ondansetron (ZOFRAN) injection 4 mg  4 mg IntraVENous Q4H PRN    LORazepam (ATIVAN) injection 1 mg  1 mg IntraVENous Q3H PRN    pantoprazole (PROTONIX) tablet 40 mg  40 mg Oral ACB&D    folic acid (FOLVITE) tablet 1 mg  1 mg Oral DAILY    thiamine HCL (B-1) tablet 100 mg  100 mg Oral DAILY    0.9% sodium chloride infusion 250 mL  250 mL IntraVENous PRN       Other Studies:  No results found for this visit on 07/16/20. No results found.     All Micro Results     None          SARS-CoV-2 Lab Results  \"Novel Coronavirus\" Test: No results found for: COV2NT   \"Emergent Disease\" Test: No results found for: EDPR  \"SARS-COV-2\" Test: No results found for: XGCOVT         Assessment and Plan:     Hospital Problems as of 7/19/2020 Never Reviewed          Codes Class Noted - Resolved POA    Alcohol intoxication ([facility] ICD-10-CM: F10.929  ICD-9-CM: 305.00  7/17/2020 - Present Yes        Alcohol dependency ([facility] ICD-10-CM: F10.20  ICD-9-CM: 303.90  7/17/2020 - Present Yes        Abdominal pain ICD-10-CM: R10.9  ICD-9-CM: 789.00  7/16/2020 - Present Yes        * (Principal) Severe anemia ICD-10-CM: D64.9  ICD-9-CM: 285.9  7/16/2020 - Present Yes              Plan:      · GI bleed and acute blood loss anemia due to gastric and duodenal angiodysplasia s/p APC:  · Trend HGB, transfuse HGB < 7  · Continue oral protonix   · regular advance      · Acute pancreatitis:  · stopped IVF   · advanced diet       · ETOH use and delirium tremens:  · Needs cessation but not likely- he states that he would continue to consume though currently homeless  · Seems to be in early withdrawals and will continue inpatient care with librium every 8 hours and ativan prn   · continued thiamine and folate      · Hyperglycemia:  · Check A1C    DC planning/Dispo:  Pending to home      Diet:  DIET GI SOFT  DVT ppx:  SCD    Signed:  Reuben Corrales MD

## 2020-07-19 NOTE — PROGRESS NOTES
All hourly rounds performed with bed in low locked position and call light within reach. Patient slept throughout the majority of the shift, and was awake watching television toward the end. He requested medication for agitation and itching around 0610, felt the withdrawal symptoms were becoming more prevalent. He was treated per [facility], all needs met, no questions or concerns at this time. I did not notice any tremors, seizure activity and VSS. I will update oncoming RN of patient status.

## 2020-07-19 NOTE — PROGRESS NOTES
Problem: Falls - Risk of  Goal: *Absence of Falls  Description: Document Leida Taylor Fall Risk and appropriate interventions in the flowsheet.   Outcome: Progressing Towards Goal  Note: Fall Risk Interventions:  Mobility Interventions: Patient to call before getting OOB         Medication Interventions: Patient to call before getting OOB    Elimination Interventions: Call light in reach    History of Falls Interventions: Door open when patient unattended

## 2020-07-19 NOTE — PROGRESS NOTES
Pt in bed, bed low and locked, call light in reach, no distress noted at this time, hourly rounds completed, report will be given to oncoming RN.

## 2020-07-20 LAB
ABO + RH BLD: NORMAL
BLD PROD TYP BPU: NORMAL
BLD PROD TYP BPU: NORMAL
BLOOD GROUP ANTIBODIES SERPL: NORMAL
BPU ID: NORMAL
BPU ID: NORMAL
CROSSMATCH RESULT,%XM: NORMAL
CROSSMATCH RESULT,%XM: NORMAL
GLUCOSE BLD STRIP.AUTO-MCNC: 105 MG/DL (ref 65–100)
GLUCOSE BLD STRIP.AUTO-MCNC: 156 MG/DL (ref 65–100)
SPECIMEN EXP DATE BLD: NORMAL
STATUS OF UNIT,%ST: NORMAL
STATUS OF UNIT,%ST: NORMAL
UNIT DIVISION, %UDIV: 0
UNIT DIVISION, %UDIV: 0

## 2020-07-20 PROCEDURE — 74011250637 HC RX REV CODE- 250/637: Performed by: INTERNAL MEDICINE

## 2020-07-20 PROCEDURE — 65660000000 HC RM CCU STEPDOWN

## 2020-07-20 PROCEDURE — 74011250637 HC RX REV CODE- 250/637: Performed by: FAMILY MEDICINE

## 2020-07-20 PROCEDURE — 82962 GLUCOSE BLOOD TEST: CPT

## 2020-07-20 RX ORDER — CHLORDIAZEPOXIDE HYDROCHLORIDE 5 MG/1
10 CAPSULE, GELATIN COATED ORAL 3 TIMES DAILY
Status: DISCONTINUED | OUTPATIENT
Start: 2020-07-20 | End: 2020-07-21

## 2020-07-20 RX ADMIN — CHLORDIAZEPOXIDE HYDROCHLORIDE 25 MG: 25 CAPSULE ORAL at 09:25

## 2020-07-20 RX ADMIN — Medication 10 ML: at 21:40

## 2020-07-20 RX ADMIN — PANTOPRAZOLE SODIUM 40 MG: 40 TABLET, DELAYED RELEASE ORAL at 06:25

## 2020-07-20 RX ADMIN — CHLORDIAZEPOXIDE HYDROCHLORIDE 10 MG: 5 CAPSULE ORAL at 17:12

## 2020-07-20 RX ADMIN — CHLORDIAZEPOXIDE HYDROCHLORIDE 10 MG: 5 CAPSULE ORAL at 21:40

## 2020-07-20 RX ADMIN — FOLIC ACID 1 MG: 1 TABLET ORAL at 09:25

## 2020-07-20 RX ADMIN — PANTOPRAZOLE SODIUM 40 MG: 40 TABLET, DELAYED RELEASE ORAL at 06:26

## 2020-07-20 RX ADMIN — Medication 10 ML: at 06:25

## 2020-07-20 RX ADMIN — PANTOPRAZOLE SODIUM 40 MG: 40 TABLET, DELAYED RELEASE ORAL at 17:12

## 2020-07-20 RX ADMIN — Medication 100 MG: at 09:00

## 2020-07-20 RX ADMIN — Medication 10 ML: at 17:13

## 2020-07-20 NOTE — PROGRESS NOTES
Problem: Falls - Risk of  Goal: *Absence of Falls  Description: Document Lou Benitez Fall Risk and appropriate interventions in the flowsheet. Outcome: Progressing Towards Goal  Note: Fall Risk Interventions:  Mobility Interventions: Assess mobility with egress test         Medication Interventions: Patient to call before getting OOB    Elimination Interventions: Call light in reach    History of Falls Interventions:  Investigate reason for fall         Problem: Patient Education: Go to Patient Education Activity  Goal: Patient/Family Education  Outcome: Progressing Towards Goal     Problem: Upper and Lower GI Bleed: Day 1  Goal: Nutrition/Diet  Outcome: Progressing Towards Goal

## 2020-07-20 NOTE — PROGRESS NOTES
Hospitalist Note     Admit Date:  2020  9:58 PM   Name:  Ghassan Allen   Age:  61 y.o.  :  1960   MRN:  [id number]   PCP:  Unknown, Provider  Treatment Team: Attending Provider: Aura Garcia MD; Utilization Review: Ruiz Orourke, RN; Primary Nurse: Corrinne Sailors, RN; Care Manager: Jonah Robles RN    HPI/Subjective:       Mr. Gila Kwong is a 60 yo male with PMH of ETOH use (states 1.5 pint liquor plus 1.5 case beer daily), admitted with acute GI bleed/ acute blood loss anemia/ pancreatitis. HGB was 5. 9. he received PRBC with improvement. He has been seen by GI s/p EGD showing gastric / duodenal angiodysplasias s/p APC. In addition, he is managed for pancreatitis. ABD US no acute issues. He developed some ETOH withdrawals and is managed with scheduled librium, prn ativan. Folate and thiamine added. Discharge plans [place] to [place] homeless.     Resting comfortably, no pain, no bleeding to his knowledge. No anxiety or shaking today. Not had any prn ativan. Objective:     Patient Vitals for the past 24 hrs:   Temp Pulse Resp BP SpO2   20 1129 98.5 °F (36.9 °C) 88 19 126/78 99 %   20 0655 98.3 °F (36.8 °C) 84 18 113/59 98 %   20 0425 98.3 °F (36.8 °C) 81 18 126/70 99 %   20 2320 98.2 °F (36.8 °C) 87 18 117/67 99 %   20 1923 98.2 °F (36.8 °C) 93 18 110/60 99 %   20 1455 98.6 °F (37 °C) 85 18 116/61 97 %     Oxygen Therapy  O2 Sat (%): 99 % (20 1129)  Pulse via Oximetry: 87 beats per minute (20 1342)  O2 Device: Room air (20 0430)  O2 Flow Rate (L/min): 3 l/min (20 1312)    Estimated body mass index is 29.67 kg/m² as calculated from the following:    Height as of this encounter: 5' 3\" (1.6 m). Weight as of this encounter: 76 kg (167 lb 8 oz).       Intake/Output Summary (Last 24 hours) at 2020 1356  Last data filed at 2020 0909  Gross per 24 hour   Intake 120 ml   Output    Net 120 ml *Note that automatically entered I/Os may not be accurate; dependent on patient compliance with collection and accurate  by techs. General:    Well nourished. Alert. No distress   CV:   RRR. No murmur, rub, or gallop. No edema  Lungs:   CTAB. No wheezing, rhonchi, or rales  Abdomen:   Soft, nontender, nondistended. +BS  Extremities: Warm and dry  Skin:     No rashes or jaundice. Neuro:  No gross focal deficits    Data Reviewed:  I have reviewed all labs, meds, and studies from the last 24 hours:  Recent Results (from the past 24 hour(s))   GLUCOSE, POC    Collection Time: 07/19/20  3:55 PM   Result Value Ref Range    Glucose (POC) 140 (H) 65 - 100 mg/dL   GLUCOSE, POC    Collection Time: 07/19/20  8:26 PM   Result Value Ref Range    Glucose (POC) 146 (H) 65 - 100 mg/dL   GLUCOSE, POC    Collection Time: 07/20/20  6:59 AM   Result Value Ref Range    Glucose (POC) 105 (H) 65 - 100 mg/dL        Current Meds:  Current Facility-Administered Medications   Medication Dose Route Frequency    chlordiazePOXIDE (LIBRIUM) capsule 25 mg  25 mg Oral TID    sodium chloride (NS) flush 5-40 mL  5-40 mL IntraVENous Q8H    sodium chloride (NS) flush 5-40 mL  5-40 mL IntraVENous PRN    ondansetron (ZOFRAN) injection 4 mg  4 mg IntraVENous Q4H PRN    LORazepam (ATIVAN) injection 1 mg  1 mg IntraVENous Q3H PRN    pantoprazole (PROTONIX) tablet 40 mg  40 mg Oral ACB&D    folic acid (FOLVITE) tablet 1 mg  1 mg Oral DAILY    thiamine HCL (B-1) tablet 100 mg  100 mg Oral DAILY    0.9% sodium chloride infusion 250 mL  250 mL IntraVENous PRN       Other Studies:  No results found for this visit on 07/16/20. No results found.     All Micro Results     None          SARS-CoV-2 Lab Results  \"Novel Coronavirus\" Test: No results found for: COV2NT   \"Emergent Disease\" Test: No results found for: EDPR  \"SARS-COV-2\" Test: No results found for: XGCOVT         Assessment and Plan:     Hospital Problems as of 7/20/2020 Never Reviewed          Codes Class Noted - Resolved POA    Alcohol intoxication ([facility] ICD-10-CM: L50.353  ICD-9-CM: 305.00  7/17/2020 - Present Yes        Alcohol dependency ([facility] ICD-10-CM: F10.20  ICD-9-CM: 303.90  7/17/2020 - Present Yes        Abdominal pain ICD-10-CM: R10.9  ICD-9-CM: 789.00  7/16/2020 - Present Yes        * (Principal) Severe anemia ICD-10-CM: D64.9  ICD-9-CM: 285.9  7/16/2020 - Present Yes              Plan:      · GI bleed and acute blood loss anemia due to gastric and duodenal angiodysplasia s/p APC:  · Trend HGB, transfuse HGB < 7  · Continue oral protonix   · regular advance      · Acute pancreatitis:  · stopped IVF   · advanced diet       · ETOH use and withdrawal:  · Needs cessation but not likely- he states that he would continue to consume though currently homeless  · Withdrawal controlled on scheduled librium - wean. ativan prn   · continued thiamine and folate      · Hyperglycemia:  · A1C 6.3    DC planning/Dispo:  Pending to home after taper librium       Diet:  DIET GI SOFT  DVT ppx:  SCD    Signed:  Ej Bowers MD

## 2020-07-20 NOTE — PROGRESS NOTES
Problem: Falls - Risk of  Goal: *Absence of Falls  Description: Document Zhou Daly Fall Risk and appropriate interventions in the flowsheet.   Outcome: Progressing Towards Goal  Note: Fall Risk Interventions:  Mobility Interventions: Patient to call before getting OOB         Medication Interventions: Patient to call before getting OOB    Elimination Interventions: Call light in reach    History of Falls Interventions: Door open when patient unattended         Problem: Patient Education: Go to Patient Education Activity  Goal: Patient/Family Education  Outcome: Progressing Towards Goal

## 2020-07-20 NOTE — PROGRESS NOTES
All hourly rounds completed with bed in low/locked position and call light within reach. Patient has rested throughout shift with no c/o pain or concerns throughout shift. All needs met at this time, I will update oncoming RN of patient status.

## 2020-07-20 NOTE — PROGRESS NOTES
CM spoke with patient in room. Patient confirmed living with his sister and stated she would be the one to transport him back home with her. No CM needs have been identified at this time. CM will continue to follow patient during hospitalization for discharge planning and needs. Please consult or notify CM for new needs.

## 2020-07-21 LAB
ANION GAP SERPL CALC-SCNC: 8 MMOL/L (ref 7–16)
BUN SERPL-MCNC: 13 MG/DL (ref 8–23)
CALCIUM SERPL-MCNC: 9 MG/DL (ref 8.3–10.4)
CHLORIDE SERPL-SCNC: 110 MMOL/L (ref 98–107)
CO2 SERPL-SCNC: 23 MMOL/L (ref 21–32)
CREAT SERPL-MCNC: 1.17 MG/DL (ref 0.8–1.5)
ERYTHROCYTE [DISTWIDTH] IN BLOOD BY AUTOMATED COUNT: 28.2 % (ref 11.9–14.6)
GLUCOSE SERPL-MCNC: 110 MG/DL (ref 65–100)
HCT VFR BLD AUTO: 26.8 % (ref 41.1–50.3)
HGB BLD-MCNC: 7.9 G/DL (ref 13.6–17.2)
MAGNESIUM SERPL-MCNC: 2.1 MG/DL (ref 1.8–2.4)
MCH RBC QN AUTO: 18.6 PG (ref 26.1–32.9)
MCHC RBC AUTO-ENTMCNC: 29.5 G/DL (ref 31.4–35)
MCV RBC AUTO: 63.2 FL (ref 79.6–97.8)
NRBC # BLD: 0 K/UL (ref 0–0.2)
PLATELET # BLD AUTO: 222 K/UL (ref 150–450)
PMV BLD AUTO: ABNORMAL FL (ref 9.4–12.3)
POTASSIUM SERPL-SCNC: 3.7 MMOL/L (ref 3.5–5.1)
RBC # BLD AUTO: 4.24 M/UL (ref 4.23–5.6)
SODIUM SERPL-SCNC: 141 MMOL/L (ref 136–145)
WBC # BLD AUTO: 9.1 K/UL (ref 4.3–11.1)

## 2020-07-21 PROCEDURE — 74011250637 HC RX REV CODE- 250/637: Performed by: FAMILY MEDICINE

## 2020-07-21 PROCEDURE — 85027 COMPLETE CBC AUTOMATED: CPT

## 2020-07-21 PROCEDURE — 74011250637 HC RX REV CODE- 250/637: Performed by: INTERNAL MEDICINE

## 2020-07-21 PROCEDURE — 80048 BASIC METABOLIC PNL TOTAL CA: CPT

## 2020-07-21 PROCEDURE — 65660000000 HC RM CCU STEPDOWN

## 2020-07-21 PROCEDURE — 94760 N-INVAS EAR/PLS OXIMETRY 1: CPT

## 2020-07-21 PROCEDURE — 83735 ASSAY OF MAGNESIUM: CPT

## 2020-07-21 PROCEDURE — 36415 COLL VENOUS BLD VENIPUNCTURE: CPT

## 2020-07-21 RX ORDER — CHLORDIAZEPOXIDE HYDROCHLORIDE 5 MG/1
5 CAPSULE, GELATIN COATED ORAL 3 TIMES DAILY
Status: DISCONTINUED | OUTPATIENT
Start: 2020-07-21 | End: 2020-07-22 | Stop reason: HOSPADM

## 2020-07-21 RX ADMIN — PANTOPRAZOLE SODIUM 40 MG: 40 TABLET, DELAYED RELEASE ORAL at 05:38

## 2020-07-21 RX ADMIN — PANTOPRAZOLE SODIUM 40 MG: 40 TABLET, DELAYED RELEASE ORAL at 17:00

## 2020-07-21 RX ADMIN — Medication 100 MG: at 09:14

## 2020-07-21 RX ADMIN — CHLORDIAZEPOXIDE HYDROCHLORIDE 10 MG: 5 CAPSULE ORAL at 09:14

## 2020-07-21 RX ADMIN — Medication 10 ML: at 05:39

## 2020-07-21 RX ADMIN — CHLORDIAZEPOXIDE HYDROCHLORIDE 5 MG: 5 CAPSULE ORAL at 17:00

## 2020-07-21 RX ADMIN — Medication 10 ML: at 13:56

## 2020-07-21 RX ADMIN — FOLIC ACID 1 MG: 1 TABLET ORAL at 09:14

## 2020-07-21 RX ADMIN — CHLORDIAZEPOXIDE HYDROCHLORIDE 5 MG: 5 CAPSULE ORAL at 22:02

## 2020-07-21 RX ADMIN — Medication 10 ML: at 22:02

## 2020-07-21 NOTE — PROGRESS NOTES
All hourly rounds completed with bed in low/locked position and call light within reach. Patient has rested throughout shift without incident. All needs met, no questions or concerns at this time. I will update oncoming RN of patient status.

## 2020-07-21 NOTE — PROGRESS NOTES
Monitor rm called d/t pt's heart rate went up to 155, pt was in the bathroom and walking around. Pt got back in bed and called monitor rm back, pt's heart rate down to NSR 90.

## 2020-07-21 NOTE — PROGRESS NOTES
Hospitalist Note     Admit Date:  2020  9:58 PM   Name:  Bennett Edgar   Age:  61 y.o.  :  1960   MRN:  [id number]   PCP:  Unknown, Provider  Treatment Team: Attending Provider: Araseli Hickman MD; Utilization Review: Matthew Márquez RN; Care Manager: Jey Taylor RN    HPI/Subjective:       Mr. Ten Baxter is a 60 yo male with PMH of ETOH use (states 1.5 pint liquor plus 1.5 case beer daily), admitted with acute GI bleed/ acute blood loss anemia/ pancreatitis. HGB was 5. 9. he received PRBC with improvement. He has been seen by GI s/p EGD showing gastric / duodenal angiodysplasias s/p APC. In addition, he is managed for pancreatitis. ABD US no acute issues. He developed some ETOH withdrawals and is managed with scheduled librium, prn ativan. Folate and thiamine added. Discharge plans pending to [place]-states homeless.     Resting comfortably, no pain, no bleeding   Decreased librium yesterday, no anxiety or shaking today. Not had any prn ativan. Objective:     Patient Vitals for the past 24 hrs:   Temp Pulse Resp BP SpO2   20 0639 98.1 °F (36.7 °C) 83 18 121/73    20 0445 98.2 °F (36.8 °C) 77 16 120/70 98 %   20 2340 98 °F (36.7 °C) 79 18 129/79 97 %   20 1929 98.2 °F (36.8 °C) 79 18 119/68 99 %   20 1748 98.6 °F (37 °C) 89 18 116/66 99 %   20 1129 98.5 °F (36.9 °C) 88 19 126/78 99 %     Oxygen Therapy  O2 Sat (%): 98 % (20 0445)  Pulse via Oximetry: 87 beats per minute (20 1342)  O2 Device: Room air (20 0430)  O2 Flow Rate (L/min): 3 l/min (20 1312)    Estimated body mass index is 29.67 kg/m² as calculated from the following:    Height as of this encounter: 5' 3\" (1.6 m). Weight as of this encounter: 76 kg (167 lb 8 oz).       Intake/Output Summary (Last 24 hours) at 2020 0949  Last data filed at 2020 0910  Gross per 24 hour   Intake 580 ml   Output    Net 580 ml       *Note that automatically entered I/Os may not be accurate; dependent on patient compliance with collection and accurate  by techs. General:    Well nourished. Alert. No distress   CV:   RRR. No murmur, rub, or gallop. No edema  Lungs:   CTAB. No wheezing, rhonchi, or rales  Abdomen:   Soft, nontender, nondistended. +BS  Extremities: Warm and dry  Skin:     No rashes or jaundice. Neuro:  No gross focal deficits    Data Reviewed:  I have reviewed all labs, meds, and studies from the last 24 hours:  Recent Results (from the past 24 hour(s))   GLUCOSE, POC    Collection Time: 07/20/20  5:46 PM   Result Value Ref Range    Glucose (POC) 156 (H) 65 - 100 mg/dL   CBC W/O DIFF    Collection Time: 07/21/20  6:14 AM   Result Value Ref Range    WBC 9.1 4.3 - 11.1 K/uL    RBC 4.24 4.23 - 5.6 M/uL    HGB 7.9 (L) 13.6 - 17.2 g/dL    HCT 26.8 (L) 41.1 - 50.3 %    MCV 63.2 (L) 79.6 - 97.8 FL    MCH 18.6 (L) 26.1 - 32.9 PG    MCHC 29.5 (L) 31.4 - 35.0 g/dL    RDW 28.2 (H) 11.9 - 14.6 %    PLATELET 862 129 - 670 K/uL    MPV Unable to calculate. Recommend adding IPF.  9.4 - 12.3 FL    ABSOLUTE NRBC 0.00 0.0 - 0.2 K/uL   METABOLIC PANEL, BASIC    Collection Time: 07/21/20  6:14 AM   Result Value Ref Range    Sodium 141 136 - 145 mmol/L    Potassium 3.7 3.5 - 5.1 mmol/L    Chloride 110 (H) 98 - 107 mmol/L    CO2 23 21 - 32 mmol/L    Anion gap 8 7 - 16 mmol/L    Glucose 110 (H) 65 - 100 mg/dL    BUN 13 8 - 23 MG/DL    Creatinine 1.17 0.8 - 1.5 MG/DL    GFR est AA >60 >60 ml/min/1.73m2    GFR est non-AA >60 >60 ml/min/1.73m2    Calcium 9.0 8.3 - 10.4 MG/DL   MAGNESIUM    Collection Time: 07/21/20  6:14 AM   Result Value Ref Range    Magnesium 2.1 1.8 - 2.4 mg/dL        Current Meds:  Current Facility-Administered Medications   Medication Dose Route Frequency    chlordiazePOXIDE (LIBRIUM) capsule 10 mg  10 mg Oral TID    sodium chloride (NS) flush 5-40 mL  5-40 mL IntraVENous Q8H    sodium chloride (NS) flush 5-40 mL  5-40 mL IntraVENous PRN    ondansetron (ZOFRAN) injection 4 mg  4 mg IntraVENous Q4H PRN    LORazepam (ATIVAN) injection 1 mg  1 mg IntraVENous Q3H PRN    pantoprazole (PROTONIX) tablet 40 mg  40 mg Oral ACB&D    folic acid (FOLVITE) tablet 1 mg  1 mg Oral DAILY    thiamine HCL (B-1) tablet 100 mg  100 mg Oral DAILY    0.9% sodium chloride infusion 250 mL  250 mL IntraVENous PRN       Other Studies:  No results found for this visit on 07/16/20. No results found. All Micro Results     None          SARS-CoV-2 Lab Results  \"Novel Coronavirus\" Test: No results found for: COV2NT   \"Emergent Disease\" Test: No results found for: EDPR  \"SARS-COV-2\" Test: No results found for: XGCOVT         Assessment and Plan:     Hospital Problems as of 7/21/2020 Never Reviewed          Codes Class Noted - Resolved POA    Alcohol intoxication ([facility] ICD-10-CM: K05.713  ICD-9-CM: 305.00  7/17/2020 - Present Yes        Alcohol dependency ([facility] ICD-10-CM: F10.20  ICD-9-CM: 303.90  7/17/2020 - Present Yes        Abdominal pain ICD-10-CM: R10.9  ICD-9-CM: 789.00  7/16/2020 - Present Yes        * (Principal) Severe anemia ICD-10-CM: D64.9  ICD-9-CM: 285.9  7/16/2020 - Present Yes              Plan:      · GI bleed and acute blood loss anemia due to gastric and duodenal angiodysplasia s/p APC:  · Trend HGB, transfuse HGB < 7  · Continue oral protonix   · Tolerating regular diet      · Acute pancreatitis:  · resolved      · ETOH use and withdrawal:  · Needs cessation but not likely- he states that he would continue to consume though currently homeless  · Withdrawal controlled on scheduled librium - weaning.  ativan prn   · continued thiamine and folate      · Hyperglycemia:  · A1C 6.3    DC planning/Dispo:  Pending to home after taper librium       Diet:  DIET GI SOFT  DVT ppx:  SCD    Signed:  Ej Bowers MD

## 2020-07-22 VITALS
SYSTOLIC BLOOD PRESSURE: 132 MMHG | HEART RATE: 75 BPM | WEIGHT: 165.4 LBS | RESPIRATION RATE: 18 BRPM | DIASTOLIC BLOOD PRESSURE: 72 MMHG | BODY MASS INDEX: 29.3 KG/M2 | HEIGHT: 63 IN | TEMPERATURE: 98.3 F | OXYGEN SATURATION: 99 %

## 2020-07-22 LAB
HCT VFR BLD AUTO: 24.9 % (ref 41.1–50.3)
HGB BLD-MCNC: 7.1 G/DL (ref 13.6–17.2)

## 2020-07-22 PROCEDURE — 85018 HEMOGLOBIN: CPT

## 2020-07-22 PROCEDURE — 36415 COLL VENOUS BLD VENIPUNCTURE: CPT

## 2020-07-22 PROCEDURE — 74011250637 HC RX REV CODE- 250/637: Performed by: INTERNAL MEDICINE

## 2020-07-22 PROCEDURE — 74011250637 HC RX REV CODE- 250/637: Performed by: FAMILY MEDICINE

## 2020-07-22 RX ORDER — FOLIC ACID 1 MG/1
1 TABLET ORAL DAILY
Qty: 30 TAB | Refills: 0 | Status: SHIPPED | OUTPATIENT
Start: 2020-07-23

## 2020-07-22 RX ORDER — FERROUS SULFATE 325(65) MG
325 TABLET, DELAYED RELEASE (ENTERIC COATED) ORAL
Qty: 30 TAB | Refills: 0 | Status: SHIPPED | OUTPATIENT
Start: 2020-07-22

## 2020-07-22 RX ORDER — PANTOPRAZOLE SODIUM 40 MG/1
40 TABLET, DELAYED RELEASE ORAL DAILY
Qty: 60 TAB | Refills: 0 | Status: SHIPPED | OUTPATIENT
Start: 2020-07-22

## 2020-07-22 RX ORDER — LANOLIN ALCOHOL/MO/W.PET/CERES
100 CREAM (GRAM) TOPICAL DAILY
Qty: 30 TAB | Refills: 0 | Status: SHIPPED | OUTPATIENT
Start: 2020-07-23

## 2020-07-22 RX ADMIN — Medication 100 MG: at 08:30

## 2020-07-22 RX ADMIN — Medication 10 ML: at 05:41

## 2020-07-22 RX ADMIN — FOLIC ACID 1 MG: 1 TABLET ORAL at 08:27

## 2020-07-22 RX ADMIN — CHLORDIAZEPOXIDE HYDROCHLORIDE 5 MG: 5 CAPSULE ORAL at 08:27

## 2020-07-22 RX ADMIN — PANTOPRAZOLE SODIUM 40 MG: 40 TABLET, DELAYED RELEASE ORAL at 05:40

## 2020-07-22 NOTE — PROGRESS NOTES
All hourly rounds completed with bed in low/locked position and call light within reach. Patient continues to be w/o tremors or signs of withdrawal tonight. All needs met with no questions or concerns at this time. I will continue to monitor and update oncoming RN of patient status.

## 2020-07-22 NOTE — DISCHARGE SUMMARY
Hospitalist Discharge Summary     Patient ID:  Lynne Bains  [id number]  20 y.o.  1960  Admit date: 7/16/2020  9:58 PM  Discharge date and time: 7/22/2020  Attending: Rima Hammond MD  PCP:  Unknown, Provider  Treatment Team: Attending Provider: Rima Hammond MD; Utilization Review: Babak Stack, RN; Care Manager: Jolayne Bamberger, RN; Utilization Review: Jc Lau RN    Principal Diagnosis Severe anemia   Principal Problem:    Severe anemia (7/16/2020)    Active Problems:    Abdominal pain (7/16/2020)      Alcohol intoxication ([facility] (7/17/2020)      Alcohol dependency ([facility] (7/17/2020)             Hospital Course:  Please refer to the admission H&P for details of presentation. In summary, the patient is a 60 yo male with PMH of ETOH use (states 1.5 pint liquor plus 1.5 case beer daily), admitted with acute GI bleed/ acute blood loss anemia/ pancreatitis.      HGB was 5. 9. he received PRBC with improvement. He has been seen by GI. EGD showing gastric / duodenal angiodysplasias s/p APC. He will continue BID PPI. Pancreatitis resolved with fluids and pain/nausea control. The patient developed some alcohol withdrawal and was managed with IV and PO benzodiazepines. He was tapered off librium with no withdrawal sx in the last several days inpatient. Avoid alcohol use. Thiamine/folate supplements. Significant Diagnostic Studies:   US ABD LTD   Final Result   IMPRESSION: Negative for gallstones or biliary tree obstruction. XR CHEST PA LAT   Final Result   IMPRESSION:    1. Clear lungs. 2. Enlarged cardiac silhouette.             Labs: Results:       Chemistry Recent Labs     07/21/20  0614 07/19/20  1010   * 125*    135*   K 3.7 3.4*   * 102   CO2 23 23   BUN 13 12   CREA 1.17 1.31   CA 9.0 9.0   AGAP 8 10      CBC w/Diff Recent Labs     07/22/20  0529 07/21/20  0614 07/19/20  1010   WBC  --  9.1 9.6   RBC  --  4.24 4.31   HGB 7.1* 7.9* 7.8*   HCT 24.9* 26.8* 27.1*   PLT  --  222 174   GRANS  --   --  62   LYMPH  --   --  25   EOS  --   --  2      Cardiac Enzymes No results for input(s): CPK, CKND1, RHONA in the last 72 hours. No lab exists for component: CKRMB, TROIP   Coagulation No results for input(s): PTP, INR, APTT, INREXT in the last 72 hours. Lipid Panel No results found for: CHOL, CHOLPOCT, CHOLX, CHLST, CHOLV, 197389, HDL, HDLP, LDL, LDLC, DLDLP, 872840, VLDLC, VLDL, TGLX, TRIGL, TRIGP, TGLPOCT, CHHD, CHHDX   BNP No results for input(s): BNPP in the last 72 hours. Liver Enzymes No results for input(s): TP, ALB, TBIL, AP in the last 72 hours. No lab exists for component: SGOT, GPT, DBIL   Thyroid Studies No results found for: T4, T3U, TSH, TSHEXT         Discharge Exam:  Visit Vitals  /72   Pulse 75   Temp 98.3 °F (36.8 °C)   Resp 18   Ht 5' 3\" (1.6 m)   Wt 75 kg (165 lb 6.4 oz)   SpO2 99%   BMI 29.30 kg/m²     General appearance: alert, cooperative, no distress, appears stated age   Lungs: clear to auscultation bilaterally  Heart: regular rate and rhythm, S1, S2 normal, no murmur, click, rub or gallop  Abdomen: soft, non-tender. Bowel sounds normal. No masses,  no organomegaly  Extremities: no cyanosis or edema  Neurologic: Grossly normal, calm, no agitation or tremor    Disposition: home  Discharge Condition: stable  Patient Instructions:   Discharge Medication List as of 7/22/2020  9:51 AM      START taking these medications    Details   folic acid (FOLVITE) 1 mg tablet Take 1 Tab by mouth daily. , Print, Disp-30 Tab,R-0      thiamine HCL (B-1) 100 mg tablet Take 1 Tab by mouth daily. , Print, Disp-30 Tab,R-0      ferrous sulfate (IRON) 325 mg (65 mg iron) EC tablet Take 1 Tab by mouth Daily (before breakfast). , Print, Disp-30 Tab,R-0             Activity: Activity as tolerated  Diet: Regular Diet    Follow-up:     Follow-up Appointments   Procedures    FOLLOW UP VISIT Appointment in: One Week PCP     PCP     Standing Status: Standing     Number of Occurrences:   1     Order Specific Question:   Appointment in     Answer:    One Week           Time spent to discharge patient 35 minutes  Signed:  Sarah Chapin MD  7/22/2020  9:24 AM

## 2020-07-22 NOTE — PROGRESS NOTES
Problem: Falls - Risk of  Goal: *Absence of Falls  Description: Document Briana Nigel Fall Risk and appropriate interventions in the flowsheet.   Outcome: Progressing Towards Goal  Note: Fall Risk Interventions:  Mobility Interventions: Patient to call before getting OOB         Medication Interventions: Patient to call before getting OOB    Elimination Interventions: Call light in reach    History of Falls Interventions: Door open when patient unattended, Investigate reason for fall

## 2020-07-22 NOTE — PROGRESS NOTES
Care Management Interventions  PCP Verified by CM: No  Mode of Transport at Discharge: Self  Transition of Care Consult (CM Consult): Discharge Planning  Discharge Durable Medical Equipment: No  Physical Therapy Consult: No  Occupational Therapy Consult: No  Speech Therapy Consult: No  Current Support Network: Relative's Home  Confirm Follow Up Transport: Family  Discharge Location  Discharge Placement: Home with family assistance    Patient to discharge home today. CM provided patient with information for [facility]. No other CM needs have been identified. Patient stated he will transport home with his family. All milestones for discharge have been met. CM remains available should any needs arise.

## 2020-07-22 NOTE — DISCHARGE INSTRUCTIONS
Patient Education        Anemia: Care Instructions  Your Care Instructions     Anemia is a low level of red blood cells, which carry oxygen throughout your body. Many things can cause anemia. Lack of iron is one of the most common causes. Your body needs iron to make hemoglobin, a substance in red blood cells that carries oxygen from the lungs to your body's cells. Without enough iron, the body produces fewer and smaller red blood cells. As a result, your body's cells do not get enough oxygen, and you feel tired and weak. And you may have trouble concentrating. Bleeding is the most common cause of a lack of iron. You may have heavy menstrual bleeding or bleeding caused by conditions such as ulcers, hemorrhoids, or cancer. Regular use of aspirin or other anti-inflammatory medicines (such as ibuprofen) also can cause bleeding in some people. A lack of iron in your diet also can cause anemia, especially at times when the body needs more iron, such as during pregnancy, infancy, and the teen years. Your doctor may have prescribed iron pills. It may take several months of treatment for your iron levels to return to normal. Your doctor also may suggest that you eat foods that are rich in iron, such as meat and beans. There are many other causes of anemia. It is not always due to a lack of iron. Finding the specific cause of your anemia will help your doctor find the right treatment for you. Follow-up care is a key part of your treatment and safety. Be sure to make and go to all appointments, and call your doctor if you are having problems. It's also a good idea to know your test results and keep a list of the medicines you take. How can you care for yourself at home? · Take your medicines exactly as prescribed. Call your doctor if you think you are having a problem with your medicine.   · If your doctor recommends iron pills, take them as directed:  ? Try to take the pills on an empty stomach about 1 hour before or 2 hours after meals. But you may need to take iron with food to avoid an upset stomach. ? Do not take antacids or drink milk or caffeine drinks (such as coffee, tea, or cola) at the same time or within 2 hours of the time that you take your iron. They can make it hard for your body to absorb the iron. ? Vitamin C (from food or supplements) helps your body absorb iron. Try taking iron pills with a glass of orange juice or some other food that is high in vitamin C, such as citrus fruits. ? Iron pills may cause stomach problems, such as heartburn, nausea, diarrhea, constipation, and cramps. Be sure to drink plenty of fluids, and include fruits, vegetables, and fiber in your diet each day. Iron pills often make your bowel movements dark or green. ? If you forget to take an iron pill, do not take a double dose of iron the next time you take a pill. ? Keep iron pills out of the reach of small children. An overdose of iron can be very dangerous. · Follow your doctor's advice about eating iron-rich foods. These include red meat, shellfish, poultry, eggs, beans, raisins, whole-grain bread, and leafy green vegetables. · Steam vegetables to help them keep their iron content. When should you call for help? [id number] anytime you think you may need emergency care. For example, call if:  · You have symptoms of a heart attack. These may include:  ? Chest pain or pressure, or a strange feeling in the chest.  ? Sweating. ? Shortness of breath. ? Nausea or vomiting. ? Pain, pressure, or a strange feeling in the back, neck, jaw, or upper belly or in one or both shoulders or arms. ? Lightheadedness or sudden weakness. ? A fast or irregular heartbeat. After you call 911, the  may tell you to chew 1 adult-strength or 2 to 4 low-dose aspirin. Wait for an ambulance. Do not try to drive yourself. · You passed out (lost consciousness).   Call your doctor now or seek immediate medical care if:  · You have new or increased shortness of breath. · You are dizzy or lightheaded, or you feel like you may faint. · Your fatigue and weakness continue or get worse. · You have any abnormal bleeding, such as:  ? Nosebleeds. ? Vaginal bleeding that is different (heavier, more frequent, at a different time of the month) than what you are used to.  ? Bloody or black stools, or rectal bleeding. ? Bloody or pink urine. Watch closely for changes in your health, and be sure to contact your doctor if:  · You do not get better as expected. Where can you learn more? Go to http://www.benjamin.com/  Enter R301 in the search box to learn more about \"Anemia: Care Instructions. \"  Current as of: November 8, 2019               Content Version: 12.5  © 7119-6772 [employer]. Care instructions adapted under license by [employer] (which disclaims liability or warranty for this information). If you have questions about a medical condition or this instruction, always ask your healthcare professional. [place] [address] any warranty or liability for your use of this information. Patient Education        Learning About Alcohol Withdrawal  What is alcohol withdrawal?     If you drink alcohol regularly (more than a few drinks on most days) and then suddenly stop or cut down, you may go through some physical and emotional problems while the alcohol clears out of your system. This is called withdrawal. Clearing the alcohol from your body is called detoxification, or detox. What are the symptoms? Symptoms of alcohol withdrawal may start as soon as 4 to 12 hours after you stop drinking. Or they may not start until several days after the last drink. Mild symptoms include:  · Nausea. · Sweating. · Shakiness. · Diarrhea. · Intense worry. · Disturbed sleep. · Headache. More severe symptoms include:  · Vomiting or belly pain. · Being confused, upset, and irritable. · Changed sensations.  You might feel things on your body that aren't really there. Or you may see or hear things that aren't there. · Trembling. · Being short of breath or having pain in your chest.  · Having seizures. Symptoms may peak within a few days. Mild symptoms can last for a few weeks. If your symptoms are severe, you'll need to see a doctor. What is the treatment for alcohol withdrawal?  Most people may be able to cut down or stop drinking with only mild withdrawal. They can stay safe by simply resting, drinking lots of fluids, and eating healthy foods. But people who drink large amounts of alcohol or are at risk for severe withdrawal symptoms should not try to detox at home unless they work closely with a doctor to manage it. A person can die of severe alcohol withdrawal.  Before you stop drinking, talk to your doctor about how you plan to stop. Be completely honest about how much you've been drinking. Your doctor will figure out if you need to detox in a medical center. You may get medicine to treat the symptoms whether you are at home or in a medical center. Medicine that treats seizures can also help. Your doctor will explain what types of medicine might help you. You may start with a high dose and then take smaller amounts over several days. There's also medicine that can help you avoid alcohol while you recover. How can you manage your withdrawal and recovery? Here are a few tips that can help you to not start drinking again. · Make sure there's no alcohol in the house. This includes drinks as well as liquid medicines, rubbing alcohol, and certain flavorings like vanilla extract. · Try not to hang out with people you used to drink with. · Don't go it alone. Spend time with people who support the changes you are making in your life. This includes asking for advice and help from people who have stopped drinking. You might also try mutual support groups such as Alcoholics Anonymous. · Drink lots of fluids.   · Eat snacks such as fruit, cheese and crackers, and pretzels. High-carbohydrate foods may help reduce the craving for alcohol. What happens after withdrawal?  It can be hard to stop drinking. But after you clear the alcohol from your system, you can start the next, healthier part of your life. After detox, you will focus on staying alcohol-free. You can learn skills that you can use to stay abstinent (or sober) as you recover. Finding new ways to deal with life's challenges, without drinking, takes time and effort. Recovery is a long-term process. It's not something you can achieve in a few weeks. Most people get some type of therapy, such as group counseling. You also may need medicine to help you stay sober. Treatment doesn't focus on alcohol use alone. It may address other parts of your life, like your relationships, work, medical problems, and home life. Treatment, support, patience, and commitment will help you make the changes you need to live a kohler life without alcohol. You may find, over time, that the process gets easier, life becomes more joyous, and your connections to others becomes more rewarding. Where can you find help? Behavioral Health Treatment Services . This service from the [facility] Substance Abuse and [employer]  can help you find local alcohol treatment services. Search online at Aroldo. samhsa.gov or call 4-558-270-SUFW ([phone]), or Jamn 5-681.929.7937. Where can you learn more? Go to http://www.gray.com/  Enter A011 in the search box to learn more about \"Learning About Alcohol Withdrawal.\"  Current as of: August 22, 2019               Content Version: 12.5  © 9188-7467 [employer]. Care instructions adapted under license by [employer] (which disclaims liability or warranty for this information).  If you have questions about a medical condition or this instruction, always ask your healthcare professional. [address] any warranty or liability for your use of this information.        Follow Up Appointment with Gastroenterology, [facility]  August 18, 2020 at 10:00 AM with Reji Brewster

## 2020-07-22 NOTE — PROGRESS NOTES
I have reviewed discharge instructions with the patient. The patient verbalized understanding. Patient will call when sister is here.

## 2021-06-09 ENCOUNTER — APPOINTMENT (OUTPATIENT)
Dept: ULTRASOUND IMAGING | Age: 61
DRG: 439 | End: 2021-06-09
Attending: INTERNAL MEDICINE

## 2021-06-09 ENCOUNTER — ANESTHESIA EVENT (OUTPATIENT)
Dept: ENDOSCOPY | Age: 61
DRG: 439 | End: 2021-06-09

## 2021-06-09 ENCOUNTER — APPOINTMENT (OUTPATIENT)
Dept: GENERAL RADIOLOGY | Age: 61
DRG: 439 | End: 2021-06-09
Attending: STUDENT IN AN ORGANIZED HEALTH CARE EDUCATION/TRAINING PROGRAM

## 2021-06-09 ENCOUNTER — HOSPITAL ENCOUNTER (INPATIENT)
Age: 61
LOS: 2 days | Discharge: HOME | DRG: 439 | End: 2021-06-11
Attending: STUDENT IN AN ORGANIZED HEALTH CARE EDUCATION/TRAINING PROGRAM | Admitting: INTERNAL MEDICINE

## 2021-06-09 DIAGNOSIS — K85.90 ACUTE PANCREATITIS, UNSPECIFIED COMPLICATION STATUS, UNSPECIFIED PANCREATITIS TYPE: ICD-10-CM

## 2021-06-09 DIAGNOSIS — D64.9 ANEMIA, UNSPECIFIED TYPE: Primary | ICD-10-CM

## 2021-06-09 PROBLEM — R73.03 PREDIABETES: Status: ACTIVE | Noted: 2021-06-09

## 2021-06-09 PROBLEM — N17.9 AKI (ACUTE KIDNEY INJURY) (HCC): Status: ACTIVE | Noted: 2021-06-09

## 2021-06-09 PROBLEM — N18.2 STAGE 2 CHRONIC KIDNEY DISEASE: Status: ACTIVE | Noted: 2021-06-09

## 2021-06-09 LAB
ALBUMIN SERPL-MCNC: 3.9 G/DL (ref 3.2–4.6)
ALBUMIN/GLOB SERPL: 0.8 {RATIO} (ref 1.2–3.5)
ALP SERPL-CCNC: 71 U/L (ref 50–136)
ALT SERPL-CCNC: 48 U/L (ref 12–65)
ANION GAP SERPL CALC-SCNC: 10 MMOL/L (ref 7–16)
AST SERPL-CCNC: 83 U/L (ref 15–37)
ATRIAL RATE: 98 BPM
BASOPHILS # BLD: 0 K/UL (ref 0–0.2)
BASOPHILS # BLD: 0.1 K/UL (ref 0–0.2)
BASOPHILS NFR BLD: 1 % (ref 0–2)
BASOPHILS NFR BLD: 1 % (ref 0–2)
BILIRUB SERPL-MCNC: 0.4 MG/DL (ref 0.2–1.1)
BUN SERPL-MCNC: 24 MG/DL (ref 8–23)
CALCIUM SERPL-MCNC: 8.8 MG/DL (ref 8.3–10.4)
CALCULATED P AXIS, ECG09: 53 DEGREES
CALCULATED R AXIS, ECG10: -4 DEGREES
CALCULATED T AXIS, ECG11: 42 DEGREES
CHLORIDE SERPL-SCNC: 110 MMOL/L (ref 98–107)
CHOLEST SERPL-MCNC: 213 MG/DL
CO2 SERPL-SCNC: 21 MMOL/L (ref 21–32)
CREAT SERPL-MCNC: 1.59 MG/DL (ref 0.8–1.5)
DIAGNOSIS, 93000: NORMAL
DIFFERENTIAL METHOD BLD: ABNORMAL
DIFFERENTIAL METHOD BLD: ABNORMAL
EOSINOPHIL # BLD: 0.1 K/UL (ref 0–0.8)
EOSINOPHIL # BLD: 0.1 K/UL (ref 0–0.8)
EOSINOPHIL NFR BLD: 1 % (ref 0.5–7.8)
EOSINOPHIL NFR BLD: 2 % (ref 0.5–7.8)
ERYTHROCYTE [DISTWIDTH] IN BLOOD BY AUTOMATED COUNT: 19.9 % (ref 11.9–14.6)
ERYTHROCYTE [DISTWIDTH] IN BLOOD BY AUTOMATED COUNT: 24.8 % (ref 11.9–14.6)
EST. AVERAGE GLUCOSE BLD GHB EST-MCNC: 111 MG/DL
ETHANOL SERPL-MCNC: 213 MG/DL
FERRITIN SERPL-MCNC: 11 NG/ML (ref 8–388)
FOLATE SERPL-MCNC: 18.6 NG/ML (ref 3.1–17.5)
GLOBULIN SER CALC-MCNC: 4.8 G/DL (ref 2.3–3.5)
GLUCOSE BLD STRIP.AUTO-MCNC: 101 MG/DL (ref 65–100)
GLUCOSE SERPL-MCNC: 95 MG/DL (ref 65–100)
HBA1C MFR BLD: 5.5 % (ref 4.2–6.3)
HCT VFR BLD AUTO: 21.3 % (ref 41.1–50.3)
HCT VFR BLD AUTO: 27.1 % (ref 41.1–50.3)
HDLC SERPL-MCNC: 90 MG/DL (ref 40–60)
HDLC SERPL: 2.4 {RATIO}
HGB BLD-MCNC: 6 G/DL (ref 13.6–17.2)
HGB BLD-MCNC: 7.7 G/DL (ref 13.6–17.2)
HISTORY CHECKED?,CKHIST: NORMAL
HISTORY CHECKED?,CKHIST: NORMAL
IMM GRANULOCYTES # BLD AUTO: 0 K/UL (ref 0–0.5)
IMM GRANULOCYTES # BLD AUTO: 0 K/UL (ref 0–0.5)
IMM GRANULOCYTES NFR BLD AUTO: 0 % (ref 0–5)
IMM GRANULOCYTES NFR BLD AUTO: 1 % (ref 0–5)
INR PPP: 1
IRON SERPL-MCNC: 26 UG/DL (ref 35–150)
LDLC SERPL CALC-MCNC: 108.6 MG/DL
LIPASE SERPL-CCNC: 1199 U/L (ref 73–393)
LYMPHOCYTES # BLD: 1.4 K/UL (ref 0.5–4.6)
LYMPHOCYTES # BLD: 2.5 K/UL (ref 0.5–4.6)
LYMPHOCYTES NFR BLD: 20 % (ref 13–44)
LYMPHOCYTES NFR BLD: 34 % (ref 13–44)
MAGNESIUM SERPL-MCNC: 2.6 MG/DL (ref 1.8–2.4)
MCH RBC QN AUTO: 16.5 PG (ref 26.1–32.9)
MCH RBC QN AUTO: 17.7 PG (ref 26.1–32.9)
MCHC RBC AUTO-ENTMCNC: 28.2 G/DL (ref 31.4–35)
MCHC RBC AUTO-ENTMCNC: 28.4 G/DL (ref 31.4–35)
MCV RBC AUTO: 58.7 FL (ref 79.6–97.8)
MCV RBC AUTO: 62.4 FL (ref 79.6–97.8)
MONOCYTES # BLD: 0.9 K/UL (ref 0.1–1.3)
MONOCYTES # BLD: 1 K/UL (ref 0.1–1.3)
MONOCYTES NFR BLD: 13 % (ref 4–12)
MONOCYTES NFR BLD: 14 % (ref 4–12)
NEUTS SEG # BLD: 3.8 K/UL (ref 1.7–8.2)
NEUTS SEG # BLD: 4.5 K/UL (ref 1.7–8.2)
NEUTS SEG NFR BLD: 51 % (ref 43–78)
NEUTS SEG NFR BLD: 64 % (ref 43–78)
NRBC # BLD: 0.03 K/UL (ref 0–0.2)
NRBC # BLD: 0.05 K/UL (ref 0–0.2)
P-R INTERVAL, ECG05: 142 MS
PLATELET # BLD AUTO: 149 K/UL (ref 150–450)
PLATELET # BLD AUTO: 150 K/UL (ref 150–450)
PMV BLD AUTO: ABNORMAL FL (ref 9.4–12.3)
PMV BLD AUTO: ABNORMAL FL (ref 9.4–12.3)
POTASSIUM SERPL-SCNC: 3.8 MMOL/L (ref 3.5–5.1)
PROT SERPL-MCNC: 8.7 G/DL (ref 6.3–8.2)
PROTHROMBIN TIME: 12.9 SEC (ref 12.5–14.7)
Q-T INTERVAL, ECG07: 354 MS
QRS DURATION, ECG06: 90 MS
QTC CALCULATION (BEZET), ECG08: 451 MS
RBC # BLD AUTO: 3.63 M/UL (ref 4.23–5.6)
RBC # BLD AUTO: 4.34 M/UL (ref 4.23–5.6)
SERVICE CMNT-IMP: ABNORMAL
SODIUM SERPL-SCNC: 141 MMOL/L (ref 136–145)
T4 FREE SERPL-MCNC: 0.9 NG/DL (ref 0.9–1.8)
TRIGL SERPL-MCNC: 72 MG/DL (ref 35–150)
TROPONIN-HIGH SENSITIVITY: 15.6 PG/ML (ref 0–14)
TSH SERPL DL<=0.005 MIU/L-ACNC: 0.78 UIU/ML (ref 0.36–3.74)
VENTRICULAR RATE, ECG03: 98 BPM
VIT B12 SERPL-MCNC: 572 PG/ML (ref 193–986)
VLDLC SERPL CALC-MCNC: 14.4 MG/DL (ref 6–23)
WBC # BLD AUTO: 7.1 K/UL (ref 4.3–11.1)
WBC # BLD AUTO: 7.4 K/UL (ref 4.3–11.1)

## 2021-06-09 PROCEDURE — 86901 BLOOD TYPING SEROLOGIC RH(D): CPT

## 2021-06-09 PROCEDURE — P9016 RBC LEUKOCYTES REDUCED: HCPCS

## 2021-06-09 PROCEDURE — 30233N1 TRANSFUSION OF NONAUTOLOGOUS RED BLOOD CELLS INTO PERIPHERAL VEIN, PERCUTANEOUS APPROACH: ICD-10-PCS | Performed by: STUDENT IN AN ORGANIZED HEALTH CARE EDUCATION/TRAINING PROGRAM

## 2021-06-09 PROCEDURE — 74011250636 HC RX REV CODE- 250/636: Performed by: STUDENT IN AN ORGANIZED HEALTH CARE EDUCATION/TRAINING PROGRAM

## 2021-06-09 PROCEDURE — 82746 ASSAY OF FOLIC ACID SERUM: CPT

## 2021-06-09 PROCEDURE — 82607 VITAMIN B-12: CPT

## 2021-06-09 PROCEDURE — 80053 COMPREHEN METABOLIC PANEL: CPT

## 2021-06-09 PROCEDURE — 36415 COLL VENOUS BLD VENIPUNCTURE: CPT

## 2021-06-09 PROCEDURE — 76705 ECHO EXAM OF ABDOMEN: CPT

## 2021-06-09 PROCEDURE — 83540 ASSAY OF IRON: CPT

## 2021-06-09 PROCEDURE — 86923 COMPATIBILITY TEST ELECTRIC: CPT

## 2021-06-09 PROCEDURE — 83690 ASSAY OF LIPASE: CPT

## 2021-06-09 PROCEDURE — 82077 ASSAY SPEC XCP UR&BREATH IA: CPT

## 2021-06-09 PROCEDURE — 84484 ASSAY OF TROPONIN QUANT: CPT

## 2021-06-09 PROCEDURE — 99285 EMERGENCY DEPT VISIT HI MDM: CPT

## 2021-06-09 PROCEDURE — 84443 ASSAY THYROID STIM HORMONE: CPT

## 2021-06-09 PROCEDURE — 85610 PROTHROMBIN TIME: CPT

## 2021-06-09 PROCEDURE — 93005 ELECTROCARDIOGRAM TRACING: CPT

## 2021-06-09 PROCEDURE — 71046 X-RAY EXAM CHEST 2 VIEWS: CPT

## 2021-06-09 PROCEDURE — C9113 INJ PANTOPRAZOLE SODIUM, VIA: HCPCS | Performed by: INTERNAL MEDICINE

## 2021-06-09 PROCEDURE — 85025 COMPLETE CBC W/AUTO DIFF WBC: CPT

## 2021-06-09 PROCEDURE — 82962 GLUCOSE BLOOD TEST: CPT

## 2021-06-09 PROCEDURE — 74011000250 HC RX REV CODE- 250: Performed by: STUDENT IN AN ORGANIZED HEALTH CARE EDUCATION/TRAINING PROGRAM

## 2021-06-09 PROCEDURE — 94762 N-INVAS EAR/PLS OXIMTRY CONT: CPT

## 2021-06-09 PROCEDURE — 74011250636 HC RX REV CODE- 250/636: Performed by: INTERNAL MEDICINE

## 2021-06-09 PROCEDURE — 65660000000 HC RM CCU STEPDOWN

## 2021-06-09 PROCEDURE — 80061 LIPID PANEL: CPT

## 2021-06-09 PROCEDURE — 84439 ASSAY OF FREE THYROXINE: CPT

## 2021-06-09 PROCEDURE — 74011000258 HC RX REV CODE- 258: Performed by: INTERNAL MEDICINE

## 2021-06-09 PROCEDURE — 96374 THER/PROPH/DIAG INJ IV PUSH: CPT

## 2021-06-09 PROCEDURE — 82728 ASSAY OF FERRITIN: CPT

## 2021-06-09 PROCEDURE — 74011250636 HC RX REV CODE- 250/636: Performed by: HOSPITALIST

## 2021-06-09 PROCEDURE — 83735 ASSAY OF MAGNESIUM: CPT

## 2021-06-09 PROCEDURE — 74011250637 HC RX REV CODE- 250/637: Performed by: INTERNAL MEDICINE

## 2021-06-09 PROCEDURE — 36430 TRANSFUSION BLD/BLD COMPNT: CPT

## 2021-06-09 PROCEDURE — C9113 INJ PANTOPRAZOLE SODIUM, VIA: HCPCS | Performed by: STUDENT IN AN ORGANIZED HEALTH CARE EDUCATION/TRAINING PROGRAM

## 2021-06-09 PROCEDURE — 83036 HEMOGLOBIN GLYCOSYLATED A1C: CPT

## 2021-06-09 RX ORDER — FUROSEMIDE 10 MG/ML
20 INJECTION INTRAMUSCULAR; INTRAVENOUS
Status: COMPLETED | OUTPATIENT
Start: 2021-06-09 | End: 2021-06-09

## 2021-06-09 RX ORDER — SODIUM CHLORIDE 0.9 % (FLUSH) 0.9 %
5-10 SYRINGE (ML) INJECTION EVERY 8 HOURS
Status: DISCONTINUED | OUTPATIENT
Start: 2021-06-09 | End: 2021-06-11 | Stop reason: HOSPADM

## 2021-06-09 RX ORDER — LANOLIN ALCOHOL/MO/W.PET/CERES
1 CREAM (GRAM) TOPICAL 2 TIMES DAILY WITH MEALS
Status: DISCONTINUED | OUTPATIENT
Start: 2021-06-09 | End: 2021-06-11 | Stop reason: HOSPADM

## 2021-06-09 RX ORDER — SODIUM CHLORIDE 9 MG/ML
250 INJECTION, SOLUTION INTRAVENOUS AS NEEDED
Status: DISCONTINUED | OUTPATIENT
Start: 2021-06-09 | End: 2021-06-11

## 2021-06-09 RX ORDER — CHLORDIAZEPOXIDE HYDROCHLORIDE 25 MG/1
25 CAPSULE, GELATIN COATED ORAL DAILY
Status: DISCONTINUED | OUTPATIENT
Start: 2021-06-14 | End: 2021-06-11 | Stop reason: HOSPADM

## 2021-06-09 RX ORDER — LORAZEPAM 2 MG/ML
1 INJECTION INTRAMUSCULAR
Status: DISCONTINUED | OUTPATIENT
Start: 2021-06-09 | End: 2021-06-11 | Stop reason: HOSPADM

## 2021-06-09 RX ORDER — LORAZEPAM 1 MG/1
1-2 TABLET ORAL
Status: ACTIVE | OUTPATIENT
Start: 2021-06-09 | End: 2021-06-09

## 2021-06-09 RX ORDER — INSULIN LISPRO 100 [IU]/ML
INJECTION, SOLUTION INTRAVENOUS; SUBCUTANEOUS
Status: DISCONTINUED | OUTPATIENT
Start: 2021-06-09 | End: 2021-06-09

## 2021-06-09 RX ORDER — CHLORDIAZEPOXIDE HYDROCHLORIDE 25 MG/1
25 CAPSULE, GELATIN COATED ORAL 3 TIMES DAILY
Status: COMPLETED | OUTPATIENT
Start: 2021-06-09 | End: 2021-06-11

## 2021-06-09 RX ORDER — SODIUM CHLORIDE 0.9 % (FLUSH) 0.9 %
5-40 SYRINGE (ML) INJECTION EVERY 8 HOURS
Status: DISCONTINUED | OUTPATIENT
Start: 2021-06-09 | End: 2021-06-11 | Stop reason: HOSPADM

## 2021-06-09 RX ORDER — CHLORDIAZEPOXIDE HYDROCHLORIDE 25 MG/1
25 CAPSULE, GELATIN COATED ORAL 2 TIMES DAILY
Status: DISCONTINUED | OUTPATIENT
Start: 2021-06-11 | End: 2021-06-11 | Stop reason: HOSPADM

## 2021-06-09 RX ORDER — CHLORDIAZEPOXIDE HYDROCHLORIDE 25 MG/1
25 CAPSULE, GELATIN COATED ORAL
Status: DISCONTINUED | OUTPATIENT
Start: 2021-06-16 | End: 2021-06-11 | Stop reason: HOSPADM

## 2021-06-09 RX ORDER — LANOLIN ALCOHOL/MO/W.PET/CERES
100 CREAM (GRAM) TOPICAL DAILY
Status: DISCONTINUED | OUTPATIENT
Start: 2021-06-10 | End: 2021-06-11 | Stop reason: HOSPADM

## 2021-06-09 RX ORDER — SODIUM CHLORIDE, SODIUM LACTATE, POTASSIUM CHLORIDE, CALCIUM CHLORIDE 600; 310; 30; 20 MG/100ML; MG/100ML; MG/100ML; MG/100ML
100 INJECTION, SOLUTION INTRAVENOUS CONTINUOUS
Status: DISCONTINUED | OUTPATIENT
Start: 2021-06-09 | End: 2021-06-10

## 2021-06-09 RX ORDER — SODIUM CHLORIDE 9 MG/ML
250 INJECTION, SOLUTION INTRAVENOUS AS NEEDED
Status: DISCONTINUED | OUTPATIENT
Start: 2021-06-09 | End: 2021-06-11 | Stop reason: HOSPADM

## 2021-06-09 RX ORDER — SODIUM CHLORIDE 0.9 % (FLUSH) 0.9 %
5-40 SYRINGE (ML) INJECTION AS NEEDED
Status: DISCONTINUED | OUTPATIENT
Start: 2021-06-09 | End: 2021-06-11 | Stop reason: HOSPADM

## 2021-06-09 RX ORDER — SODIUM CHLORIDE 0.9 % (FLUSH) 0.9 %
5-10 SYRINGE (ML) INJECTION AS NEEDED
Status: DISCONTINUED | OUTPATIENT
Start: 2021-06-09 | End: 2021-06-11 | Stop reason: HOSPADM

## 2021-06-09 RX ORDER — FOLIC ACID 1 MG/1
1 TABLET ORAL DAILY
Status: DISCONTINUED | OUTPATIENT
Start: 2021-06-10 | End: 2021-06-11 | Stop reason: HOSPADM

## 2021-06-09 RX ADMIN — SODIUM CHLORIDE, SODIUM LACTATE, POTASSIUM CHLORIDE, AND CALCIUM CHLORIDE 100 ML/HR: 600; 310; 30; 20 INJECTION, SOLUTION INTRAVENOUS at 15:30

## 2021-06-09 RX ADMIN — Medication 10 ML: at 15:41

## 2021-06-09 RX ADMIN — PANTOPRAZOLE SODIUM 40 MG: 40 INJECTION, POWDER, FOR SOLUTION INTRAVENOUS at 21:28

## 2021-06-09 RX ADMIN — Medication 10 ML: at 21:29

## 2021-06-09 RX ADMIN — LORAZEPAM 1 MG: 2 INJECTION INTRAMUSCULAR; INTRAVENOUS at 22:50

## 2021-06-09 RX ADMIN — THIAMINE HYDROCHLORIDE 100 MG: 100 INJECTION, SOLUTION INTRAMUSCULAR; INTRAVENOUS at 12:44

## 2021-06-09 RX ADMIN — CHLORDIAZEPOXIDE HYDROCHLORIDE 25 MG: 25 CAPSULE ORAL at 21:29

## 2021-06-09 RX ADMIN — FERROUS SULFATE TAB 325 MG (65 MG ELEMENTAL FE) 325 MG: 325 (65 FE) TAB at 17:41

## 2021-06-09 RX ADMIN — SODIUM CHLORIDE, SODIUM LACTATE, POTASSIUM CHLORIDE, AND CALCIUM CHLORIDE 1000 ML: 600; 310; 30; 20 INJECTION, SOLUTION INTRAVENOUS at 12:44

## 2021-06-09 RX ADMIN — CHLORDIAZEPOXIDE HYDROCHLORIDE 25 MG: 25 CAPSULE ORAL at 17:41

## 2021-06-09 RX ADMIN — PANTOPRAZOLE SODIUM 40 MG: 40 INJECTION, POWDER, FOR SOLUTION INTRAVENOUS at 09:23

## 2021-06-09 RX ADMIN — FUROSEMIDE 20 MG: 10 INJECTION, SOLUTION INTRAMUSCULAR; INTRAVENOUS at 12:44

## 2021-06-09 NOTE — H&P
[facility] Hospitalist History and Physical       Name:  Arely Humphrey  Age:61 y.o. Sex:male   :  1960    MRN:  [id number]   PCP:  Unknown, Provider      Admit Date:  2021  8:37 AM   Chief Complaint: Chest pain    Reason for Admission:   Symptomatic anemia [D64.9]    Assessment & Plan:     #Symptomatic anemia:  Patient has symptomatic anemia with hemoglobin of 6.  1 unit PRBC ordered. Lasix with blood transfusion ordered. Iron studies, B12 level and folate level ordered. GI consulted given history of angiodysplasia in the past.  Started patient on Protonix 40 mg IV twice daily. Posttransfusion CBC. #Elevated lipase: Patient has elevated lipase which is 3 times upper normal limit. Does not have typical history of pancreatitis. Will not do CT contrast at present that patient pain is improving and has ISABELLA. We will treat like pancreatitis for now. IV fluid bolus followed by IV fluid hydration. N.p.o. till evaluated by gastroenterologist.  Lipid profile. Right upper quadrant ultrasound. #Elevated liver enzymes: Likely secondary to alcohol use given AST is higher than ALT. Will monitor. #Alcohol use: History of alcohol withdrawal.  Librium taper ordered. CIWA protocol ordered. Thiamine, folic acid and multivitamin. #Acute on chronic kidney injury: Baseline creatinine around 1.2. Today creatinine around 1.6. Likely secondary to dehydration. IV fluid bolus followed by IV hydration. Monitor creatinine. #Prediabetes: Last year HbA1c 6.3. HbA1c ordered. Diet: DIET NPO  VTE ppx: SCDs for DVT prophylaxis given symptomatic anemia  Code status: Prior      Patient wants his sister to be power of  and wants to be full code. He will get us the number for his sister. He verbalized understanding that his condition may deteriorate in spite of treatment. He verbalized increased morbidity and mortality associated with drinking of alcohol [more than recommended].   I offered but he would like to update family by himself. I encouraged him to asked the nurse to page me if he or his family has any questions. Patient is of high complexity given symptomatic anemia with hemoglobin of 6 requiring urgent blood transfusion and acute kidney injury. History of Presenting Illness:     Geovany Hull is a 64 y.o. male with medical history gastric angiodysplasia, fatty infiltration of liver, alcohol use and mild chronic pancreatitis presented to the emergency room with chief complaint of epigastric/chest pain. Patient is AOx3 and able to provide history. As per patient he started having epigastric/chest pain which is sharp in nature today early morning. Pain was severe so he decided to come to the emergency room. As per patient is having vomiting from last few days. Denies any shortness of breath. Denies any hematemesis, use of aspirin/Plavix/over-the-counter medication/anticoagulation/iron sulfate. Per patient his last EGD was during last hospitalization but he does not remember the reading but he informed me that he was told that he was bleeding internally and there was something wrong with his pancreas. He is an active smoker and drinks 7-8 beers every day. Denies any illicit drug use. Has not seen physician since last hospitalization. In the emergency room, patient found to have significant anemia with hemoglobin 6. EKG showed normal sinus rhythm. 1 unit of blood was ordered. Medicine service consulted to admit the patient for further evaluation and management. When I evaluated the patient, he denies any chest pain or epigastric pain has resolved. He denies any radiation of pain. Nausea has improved. On review of old records, patient has EGD done in 2020 and findings are as below:    1. Gastric and duodenal angiodysplasias. Treated. 2. Fatty infiltration of the liver.    3. Extensive pancreatic parenchymal and ductal abnormalities are consistent with mild chronic alcoholic pancreatitis. Again, active smoker and active alcohol use. History of alcohol withdrawal in the past.  Denies any family history of liver disease or GI bleed. Past medical history, surgical history, social history, family history, old records, emergency room course reviewed. Denies any shortness of breath, palpitation, leg swelling, hematemesis, blood in stool. Review of Systems:  A 14 point review of systems was taken and pertinent positive as per HPI. No past medical history on file. No past surgical history on file. Family History : reviewed  No family history on file. Social History     Tobacco Use    Smoking status: Current Every Day Smoker   Substance Use Topics    Alcohol use: Yes       No Known Allergies      There is no immunization history on file for this patient. PTA Medications:  Current Outpatient Medications   Medication Instructions    ferrous sulfate (IRON) 325 mg, Oral, DAILY BEFORE BREAKFAST    folic acid (FOLVITE) 1 mg, Oral, DAILY    pantoprazole (PROTONIX) 40 mg, Oral, DAILY    thiamine HCL (B-1) 100 mg, Oral, DAILY       Objective:     Patient Vitals for the past 24 hrs:   Temp Pulse Resp BP SpO2   06/09/21 1134  69  (!) 124/57 96 %   06/09/21 1119  68  134/63 96 %   06/09/21 1105 98.1 °F (36.7 °C) 82 16 (!) 148/71 97 %   06/09/21 1050 98.1 °F (36.7 °C) 81 16 (!) 150/79 99 %   06/09/21 1034 98 °F (36.7 °C) 78 16 136/79 97 %   06/09/21 0817 97.7 °F (36.5 °C) 97 16 (!) 145/76 100 %       Oxygen Therapy  O2 Sat (%): 96 % (06/09/21 1134)  Pulse via Oximetry: 63 beats per minute (06/09/21 1134)  O2 Device: None (Room air) (06/09/21 1034)    Body mass index is 26.63 kg/m². Physical Exam:    General:  No acute distress, speaking in full sentences  HEENT:  Extraocular muscle seems intact.   Mucous membranes slightly dry  Neck:   Supple,  no JVD   Lungs:  Clear to auscultation bilaterally   CV:   Regular rate and rhythm with normal S1 and S2 Abdomen:  Soft, nontender, nondistended, normoactive bowel sounds   Extremities:  No cyanosis  or edema   Neuro:  AOx3, moving all 4 extremities, speech normal.  Psych:  Normal mood and affect       Data Reviewed: I have reviewed all labs, meds, and studies. Recent Results (from the past 24 hour(s))   TROPONIN-HIGH SENSITIVITY    Collection Time: 06/09/21  8:23 AM   Result Value Ref Range    Troponin-High Sensitivity 15.6 (H) 0 - 14 pg/mL   CBC WITH AUTOMATED DIFF    Collection Time: 06/09/21  8:23 AM   Result Value Ref Range    WBC 7.4 4.3 - 11.1 K/uL    RBC 3.63 (L) 4.23 - 5.6 M/uL    HGB 6.0 (LL) 13.6 - 17.2 g/dL    HCT 21.3 (L) 41.1 - 50.3 %    MCV 58.7 (L) 79.6 - 97.8 FL    MCH 16.5 (L) 26.1 - 32.9 PG    MCHC 28.2 (L) 31.4 - 35.0 g/dL    RDW 19.9 (H) 11.9 - 14.6 %    PLATELET 443 377 - 313 K/uL    MPV Unable to calculate. Recommend adding IPF. 9.4 - 12.3 FL    ABSOLUTE NRBC 0.05 0.0 - 0.2 K/uL    DF AUTOMATED      NEUTROPHILS 51 43 - 78 %    LYMPHOCYTES 34 13 - 44 %    MONOCYTES 13 (H) 4.0 - 12.0 %    EOSINOPHILS 1 0.5 - 7.8 %    BASOPHILS 1 0.0 - 2.0 %    IMMATURE GRANULOCYTES 0 0.0 - 5.0 %    ABS. NEUTROPHILS 3.8 1.7 - 8.2 K/UL    ABS. LYMPHOCYTES 2.5 0.5 - 4.6 K/UL    ABS. MONOCYTES 0.9 0.1 - 1.3 K/UL    ABS. EOSINOPHILS 0.1 0.0 - 0.8 K/UL    ABS. BASOPHILS 0.0 0.0 - 0.2 K/UL    ABS. IMM. GRANS. 0.0 0.0 - 0.5 K/UL   METABOLIC PANEL, COMPREHENSIVE    Collection Time: 06/09/21  8:23 AM   Result Value Ref Range    Sodium 141 136 - 145 mmol/L    Potassium 3.8 3.5 - 5.1 mmol/L    Chloride 110 (H) 98 - 107 mmol/L    CO2 21 21 - 32 mmol/L    Anion gap 10 7 - 16 mmol/L    Glucose 95 65 - 100 mg/dL    BUN 24 (H) 8 - 23 MG/DL    Creatinine 1.59 (H) 0.8 - 1.5 MG/DL    GFR est AA 57 (L) >60 ml/min/1.73m2    GFR est non-AA 47 (L) >60 ml/min/1.73m2    Calcium 8.8 8.3 - 10.4 MG/DL    Bilirubin, total 0.4 0.2 - 1.1 MG/DL    ALT (SGPT) 48 12 - 65 U/L    AST (SGOT) 83 (H) 15 - 37 U/L    Alk.  phosphatase 71 50 - 136 U/L Protein, total 8.7 (H) 6.3 - 8.2 g/dL    Albumin 3.9 3.2 - 4.6 g/dL    Globulin 4.8 (H) 2.3 - 3.5 g/dL    A-G Ratio 0.8 (L) 1.2 - 3.5     LIPASE    Collection Time: 06/09/21  8:23 AM   Result Value Ref Range    Lipase 1,199 (H) 73 - 393 U/L   MAGNESIUM    Collection Time: 06/09/21  8:23 AM   Result Value Ref Range    Magnesium 2.6 (H) 1.8 - 2.4 mg/dL   RBC, ALLOCATE    Collection Time: 06/09/21  8:45 AM   Result Value Ref Range    HISTORY CHECKED? Historical check performed    TYPE & SCREEN    Collection Time: 06/09/21  8:46 AM   Result Value Ref Range    Crossmatch Expiration 06/12/2021,2359     ABO/Rh(D) A POSITIVE     Antibody screen NEG     Comment BLOOD READY SPOKE TO ABA IN ED 0943 06/09/2021 [facility]     Unit number [id number]     Blood component type RC LR     Unit division 00     Status of unit ISSUED     Crossmatch result Compatible     Unit number [id number]     Blood component type RC LR     Unit division 00     Status of unit ALLOCATED     Crossmatch result Compatible     Unit number [id number]     Blood component type RC LR     Unit division 00     Status of unit ALLOCATED     Crossmatch result Compatible    ETHYL ALCOHOL    Collection Time: 06/09/21 10:51 AM   Result Value Ref Range    ALCOHOL(ETHYL),SERUM 213 MG/DL   RBC, ALLOCATE    Collection Time: 06/09/21 11:00 AM   Result Value Ref Range    HISTORY CHECKED? Historical check performed        EKG Results     Procedure 720 Value Units Date/Time    EKG [[id number]]     Order Status: Sent           All Micro Results     None          Other Studies:  XR CHEST PA LAT    Result Date: 6/9/2021  EXAM: XR CHEST PA LAT INDICATION: Chest Pain COMPARISON: Chest 7/16/2020 FINDINGS: The cardiomediastinal silhouette is within normal limits. No focal parenchymal process. No pleural effusion. No pneumothorax. No acute osseous abnormality. No acute cardiopulmonary abnormality.          Medications:  Medications Administered      Medications Administered pantoprazole (PROTONIX) 40 mg in 0.9% sodium chloride 10 mL injection     Admin Date  06/09/2021 Action  Given Dose  40 mg Route  IntraVENous Administered By  Elizabeth Silva RN                      Problem List:     Hospital Problems as of 6/9/2021 Never Reviewed        Codes Class Noted - Resolved POA    Symptomatic anemia ICD-10-CM: D64.9  ICD-9-CM: 285.9  6/9/2021 - Present Unknown        ISABELLA (acute kidney injury) ([facility] ICD-10-CM: N17.9  ICD-9-CM: 584.9  6/9/2021 - Present Unknown        Prediabetes ICD-10-CM: R73.03  ICD-9-CM: 790.29  6/9/2021 - Present Unknown        Stage 2 chronic kidney disease ICD-10-CM: N18.2  ICD-9-CM: 585.2  6/9/2021 - Present Unknown        Alcohol dependency ([facility] ICD-10-CM: F10.20  ICD-9-CM: 303.90  7/17/2020 - Present Yes               Signed By: Sissy Venegas MD   [employer] [facility]    June 9, 2021

## 2021-06-09 NOTE — PROGRESS NOTES
06/09/21 1525   Dual Skin Pressure Injury Assessment   Dual Skin Pressure Injury Assessment WDL   Second Care Provider (Based on [address] [place] [address]) Elaine Salazar RN   Skin Integumentary   Skin Integumentary (WDL) WDL

## 2021-06-09 NOTE — ACP (ADVANCE CARE PLANNING)
Advance Care Planning     General Advance Care Planning (ACP) Conversation      Date of Conversation: 6/9/2021  Conducted with: Patient with Decision Making Capacity    Healthcare Decision Maker:     Click here to complete [place] including selection of the [place] Relationship (ie \"Primary\")  Today we documented Decision Maker(s) consistent with Legal Next of Kin hierarchy.     Content/Action Overview:   Has NO ACP documents/care preferences - refer to ACP Clinical Specialist  Reviewed DNR/DNI and patient elects Full Code (Attempt Resuscitation)  Topics discussed: ventilation preferences and resuscitation preferences  Additional Comments: N/A     Length of Voluntary ACP Conversation in minutes:  16 minutes    Gary Ledezma LMSW

## 2021-06-09 NOTE — ROUTINE PROCESS
TRANSFER - OUT REPORT: 
 
Verbal report given to Juli Ferris RN on Jose Blood  being transferred to 5th floor for routine progression of care Report consisted of patients Situation, Background, Assessment and  
Recommendations(SBAR). Information from the following report(s) ED Summary was reviewed with the receiving nurse. Lines:  
Peripheral IV 06/09/21 Right Forearm (Active) Site Assessment Clean, dry, & intact 06/09/21 8208 Phlebitis Assessment 0 06/09/21 3131 Infiltration Assessment 0 06/09/21 1485 Dressing Status Clean, dry, & intact 06/09/21 6693 Opportunity for questions and clarification was provided. Patient transported to [address] [facility] and then to 5th floor.

## 2021-06-09 NOTE — PROGRESS NOTES
TRANSFER - IN REPORT:    Verbal report received from Marielos(name) on Merlinda Blow  being received from ED(unit) for routine progression of care      Report consisted of patients Situation, Background, Assessment and   Recommendations(SBAR). Information from the following report(s) SBAR, Kardex, ED Summary, MAR, Accordion and Recent Results was reviewed with the receiving nurse. Opportunity for questions and clarification was provided. Assessment completed upon patients arrival to unit and care assumed.

## 2021-06-09 NOTE — ED PROVIDER NOTES
17-year-old male patient presents to the emergency department with reports of intermittent chest pain and shortness of breath. Patient notes the symptoms occurring when he exerts himself. Symptoms improved with rest.  He describes aching discomfort in the center of his chest with shortness of breath. Pain does not radiate. He denies any discomfort in his arms, jaw or back. He does report some diaphoresis with shortness of breath and states he was very nauseous, vomiting several times over the weekend. This is improved. He denies any hematic emesis or hemoptysis. He reports no fever or chills. He denies abdominal discomfort and states his bowel and bladder habits have been normal.  Patient does admit to regular alcohol use stating he drank several days ago. Reports history of being told he was \"bleeding on the inside\" in the past.  He denies any blood thinner therapy. No past medical history on file. No past surgical history on file. No family history on file. Social History     Socioeconomic History    Marital status: SINGLE     Spouse name: Not on file    Number of children: Not on file    Years of education: Not on file    Highest education level: Not on file   Occupational History    Not on file   Tobacco Use    Smoking status: Current Every Day Smoker   Substance and Sexual Activity    Alcohol use: Yes    Drug use: Yes     Types: Cocaine    Sexual activity: Not on file   Other Topics Concern    Not on file   Social History Narrative    Not on file     Social Determinants of Health     Financial Resource Strain:     Difficulty of Paying Living Expenses:    Food Insecurity:     Worried About Running Out of Food in the Last Year:     [address] in the Last Year:    Transportation Needs:     Lack of Transportation (Medical):      Lack of Transportation (Non-Medical):    Physical Activity:     Days of Exercise per Week:     Minutes of Exercise per Session: Stress:     Feeling of Stress :    Social Connections:     Frequency of Communication with Friends and Family:     Frequency of Social Gatherings with Friends and Family:     Attends [employer] Services:     Active Member of Clubs or Organizations:     Attends Club or Organization Meetings:     Marital Status:    Intimate Partner Violence:     Fear of Current or Ex-Partner:     Emotionally Abused:     Physically Abused:     Sexually Abused: ALLERGIES: Patient has no known allergies. Review of Systems   Constitutional: Negative for chills, diaphoresis and fever. HENT: Negative for congestion, sneezing and sore throat. Eyes: Negative for visual disturbance. Respiratory: Positive for chest tightness and shortness of breath. Negative for cough and wheezing. Cardiovascular: Positive for chest pain. Negative for leg swelling. Gastrointestinal: Negative for abdominal pain, blood in stool, diarrhea, nausea and vomiting. Endocrine: Negative for polyuria. Genitourinary: Negative for difficulty urinating, dysuria, flank pain, hematuria and urgency. Musculoskeletal: Negative for back pain, myalgias, neck pain and neck stiffness. Skin: Negative for color change and rash. Neurological: Positive for light-headedness. Negative for dizziness, syncope, speech difficulty, weakness, numbness and headaches. Psychiatric/Behavioral: Negative for behavioral problems. All other systems reviewed and are negative. Vitals:    06/09/21 0817   BP: (!) 145/76   Pulse: 97   Resp: 16   Temp: 97.7 °F (36.5 °C)   SpO2: 100%   Weight: 72.6 kg (160 lb)   Height: 5' 5\" (1.651 m)            Physical Exam  Vitals and nursing note reviewed. Constitutional:       General: He is not in acute distress. Appearance: He is well-developed. He is not diaphoretic. Comments: Well-appearing male patient, alert and oriented to person place and time. No acute distress, speaks in clear, fluid sentences. HENT:      Head: Normocephalic and atraumatic. Right Ear: External ear normal.      Left Ear: External ear normal.      Nose: Nose normal.   Eyes:      Pupils: Pupils are equal, round, and reactive to light. Cardiovascular:      Rate and Rhythm: Normal rate and regular rhythm. Heart sounds: Normal heart sounds. No murmur heard. No friction rub. No gallop. Pulmonary:      Effort: Pulmonary effort is normal. No respiratory distress. Breath sounds: Normal breath sounds. No stridor. No decreased breath sounds, wheezing, rhonchi or rales. Chest:      Chest wall: No tenderness. Abdominal:      General: There is no distension. Palpations: Abdomen is soft. There is no mass. Tenderness: There is no abdominal tenderness. There is no guarding or rebound. Hernia: No hernia is present. Genitourinary:     Comments: Rectal exam performed, trace Hemoccult positive  Musculoskeletal:         General: No tenderness or deformity. Normal range of motion. Cervical back: Normal range of motion. Skin:     General: Skin is warm and dry. Neurological:      Mental Status: He is alert and oriented to person, place, and time. Cranial Nerves: No cranial nerve deficit. MDM  Number of Diagnoses or Management Options  Acute pancreatitis, unspecified complication status, unspecified pancreatitis type: new and requires workup  Anemia, unspecified type: new and requires workup  Diagnosis management comments: Patient CBC reveals significant anemia at 6.0. Orders placed for type and screen and transfusion of 1 unit.     EKG interpretation: Normal sinus rhythm, 98 beats a minute, normal axis, no ischemia       Amount and/or Complexity of Data Reviewed  Clinical lab tests: ordered and reviewed  Tests in the radiology section of CPT®: ordered and reviewed  Tests in the medicine section of CPT®: ordered and reviewed  Discuss the patient with other providers: yes  Independent visualization of images, tracings, or specimens: yes    Risk of Complications, Morbidity, and/or Mortality  Presenting problems: moderate  Diagnostic procedures: low  Management options: moderate    Patient Progress  Patient progress: stable         Procedures

## 2021-06-09 NOTE — ED TRIAGE NOTES
Patient ambulatory to triage with mask in place. Patient reports chest pain and shob over the last few weeks. Pt reports increased shob with exertion. Pt also reports cramping in his hands.

## 2021-06-09 NOTE — PROGRESS NOTES
Chart review complete, CM met with pt at bedside, pt found laying on stretcher alert and oriented x4, pt states he lives with sister in apartment, states she provides transportation when needed, independent with ADLS and no current DME in home for use. 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 Demographics confirmed and contact information confirmed. Self pay packet-- information about [employer] provided to pt. Care Management Interventions  PCP Verified by CM:  Yes (pt has been referred to [facility] in past but never followed up.)  Discharge Durable Medical Equipment: No  Physical Therapy Consult: No  Occupational Therapy Consult: No  Speech Therapy Consult: No  Current Support Network:  (lives wtih sister in apartment)  Confirm Follow Up Transport: Family  Discharge Location  Discharge Placement: Home

## 2021-06-09 NOTE — PROGRESS NOTES
Pt transferred from ED to room 502 for progression of care. ED CM completed an assessment earlier today as well. CM met with pt at bedside (proper social distancing and PPE were observed) to discuss d/c planning. Demographics verified and CM obtained pt's sister's name and phone number and updated pt's chart: Chevis All [phone]. Pt states he does not currently have a phone. Pt resides with his sister in a second floor apartment with approximately 15 steps to access. Pt does not have any home DME and has not had any  services. Prior to hospitalization pt was independent with ADLs but does depend on his sister for transportation. Pt does not have insurance and is unemployed. He does not have a PCP. Per chart screening pt has been given information about the [address] multiple times, including by the ED CM today. Reportedly pt has failed to f/u with these resources in the past.  Pt strongly encouraged to follow through with going to one of the clinics after d/c. Pt is currently NPO until he receives GI evaluation. He required an urgent blood transfusion as well as IV fluids. Pt regularly drinks ETOH, uses cocaine, and is a daily cigarette smoker. Pt admitted with Dx of Symptomatic anemia, Elevated lipase, Elevated liver enzymes, ETOH dependent and Hx of withdrawl (prescribed Librium taper), ISABELLA, Prediabetes, and Stage 2 CKD. Pt also has an order for hourly ETOH withdrawal assessment. Upon discussing d/c plan with pt the current plan is for him to return home with his sister when medically stable. CM will continue to follow and remain available if any needs arise. Care Management Interventions  PCP Verified by CM: No (No PCP)  Mode of Transport at Discharge:  Other (see comment) (Family)  Transition of Care Consult (CM Consult): Discharge Planning  Discharge Durable Medical Equipment: No  Physical Therapy Consult: No  Occupational Therapy Consult: No  Speech Therapy Consult: No  Current Support Network: Lives with Caregiver, Relative's Home (Resides in an apartment with sister)  Confirm Follow Up Transport: Family  The Patient and/or Patient Representative was Provided with a Choice of Provider and Agrees with the Discharge Plan?: Yes  Name of the Patient Representative Who was Provided with a Choice of Provider and Agrees with the Discharge Plan: Royer Chahal  Freedom of Choice List was Provided with Basic Dialogue that Supports the Patient's Individualized Plan of Care/Goals, Treatment Preferences and Shares the Quality Data Associated with the Providers?: Yes  [place] Resource Information Provided?: No  Discharge Location  Discharge Placement: Home

## 2021-06-09 NOTE — CONSULTS
[facility] Consult Note       Primary GI Physician: Dr Kip Zaldivar    Referring Provider:  Dr Mariposa Watson Date:  6/9/2021    Admit Date:  6/9/2021    Chief Complaint:  Pancreatitis     Subjective:     History of Present Illness:  Patient is a 64 y.o. male with PMH including but not limited to ETOH abuse, pancreatitis, gastric and duodenal AVM's, who is seen in consultation at the request of Dr. Dana Melendez for pancreatitis. He was admitted with epigastric pain. He denies tarry stool or NSAID use. He drinks 12 beers and 3 halves of liquor per day. Hgb on admission 6.0. Labs today with hgb 7.7 with BUN 24 and Cr 1.59 both elevated, lipase 1199, AST 83, alt 48, TB 0.4, AP 71, tri 72. He is S/P 1 U PRBC. He denies BRRB. No prior colo. Mother with gastric cancer. Was seen by GI 7/2020 for anemia with hgb 5.9 and epigastric pain with associated with nausea but no vomiting, change in bowel habits or overt bleeding. EUS 7/17/20 with gastric AVM, ablated, duodenal AVM's x2 ablated, fatty liver, extensive pancreatic parenchymal and ductal abnormalities CW mild chronic ETOH pancreatitis. PMH:  No past medical history on file. PSH:  No past surgical history on file. Allergies:  No Known Allergies    Home Medications:  Prior to Admission medications    Medication Sig Start Date End Date Taking? Authorizing Provider   folic acid (FOLVITE) 1 mg tablet Take 1 Tab by mouth daily. Patient not taking: Reported on 6/9/2021 7/23/20   Omar Hilario MD   thiamine HCL (B-1) 100 mg tablet Take 1 Tab by mouth daily. Patient not taking: Reported on 6/9/2021 7/23/20   Omar Hilario MD   ferrous sulfate (IRON) 325 mg (65 mg iron) EC tablet Take 1 Tab by mouth Daily (before breakfast). Patient not taking: Reported on 6/9/2021 7/22/20   Omar Hilario MD   pantoprazole (PROTONIX) 40 mg tablet Take 1 Tab by mouth daily.   Patient not taking: Reported on 6/9/2021 7/22/20   Larisa Londono Medications:  Current Facility-Administered Medications   Medication Dose Route Frequency    sodium chloride (NS) flush 5-10 mL  5-10 mL IntraVENous Q8H    sodium chloride (NS) flush 5-10 mL  5-10 mL IntraVENous PRN    0.9% sodium chloride infusion 250 mL  250 mL IntraVENous PRN    lactated Ringers infusion  100 mL/hr IntraVENous CONTINUOUS    0.9% sodium chloride infusion 250 mL  250 mL IntraVENous PRN    pantoprazole (PROTONIX) 40 mg in 0.9% sodium chloride 10 mL injection  40 mg IntraVENous Q12H    sodium chloride (NS) flush 5-40 mL  5-40 mL IntraVENous Q8H    sodium chloride (NS) flush 5-40 mL  5-40 mL IntraVENous PRN    LORazepam (ATIVAN) tablet 1-2 mg  1-2 mg Oral Q1H    [START ON 5/11/2212] folic acid (FOLVITE) tablet 1 mg  1 mg Oral DAILY    [START ON 6/10/2021] thiamine HCL (B-1) tablet 100 mg  100 mg Oral DAILY    [START ON 6/10/2021] multivitamin, tx-iron-ca-min (THERA-M w/ IRON) tablet 1 Tablet  1 Tablet Oral DAILY    chlordiazePOXIDE (LIBRIUM) capsule 25 mg  25 mg Oral TID    [START ON 6/11/2021] chlordiazePOXIDE (LIBRIUM) capsule 25 mg  25 mg Oral BID    [START ON 6/14/2021] chlordiazePOXIDE (LIBRIUM) capsule 25 mg  25 mg Oral DAILY    [START ON 6/16/2021] chlordiazePOXIDE (LIBRIUM) capsule 25 mg  25 mg Oral DAILY PRN    insulin lispro (HUMALOG) injection   SubCUTAneous TIDAC       Social History:  Social History     Tobacco Use    Smoking status: Current Every Day Smoker   Substance Use Topics    Alcohol use: Yes       Pt denies any history of drug use, blood transfusions, or tattoos. Family History:  No family history on file. Review of Systems:  A detailed 10 system ROS is obtained, with pertinent positives as listed above. All others are negative.     Diet:  NPO    Objective:     Physical Exam:  Vitals:  Visit Vitals  /65 (BP 1 Location: Left upper arm, BP Patient Position: At rest)   Pulse 80   Temp 97.8 °F (36.6 °C)   Resp 16   Ht 5' 5\" (1.651 m)   Wt 72.6 kg (160 lb)   SpO2 99%   BMI 26.63 kg/m²     Gen:  Pt is alert, cooperative, no acute distress  Skin:  Extremities and face reveal no rashes. HEENT: Sclerae anicteric. Extra-occular muscles are intact. No oral ulcers. No abnormal pigmentation of the lips. The neck is supple. Cardiovascular: Regular rate and rhythm. No murmurs, gallops, or rubs. Respiratory:  Comfortable breathing with no accessory muscle use. Clear breath sounds anteriorly with no wheezes, rales, or rhonchi. GI:  Abdomen nondistended, soft, and nontender. Normal active bowel sounds. No enlargement of the liver or spleen. No masses palpable. Rectal:  Deferred  Musculoskeletal:  No pitting edema of the lower legs. Neurological:  Gross memory appears intact. Patient is alert and oriented. Psychiatric:  Mood appears appropriate with judgement intact. Lymphatic:  No cervical or supraclavicular adenopathy. Laboratory:    Recent Labs     06/09/21  0823   WBC 7.4   HGB 6.0*   HCT 21.3*      MCV 58.7*      K 3.8   *   CO2 21   BUN 24*   CREA 1.59*   CA 8.8   MG 2.6*   GLU 95   AP 71   AST 83*   ALT 48   TBILI 0.4   ALB 3.9   TP 8.7*   LPSE 1,199*   PTP 12.9   INR 1.0          Assessment:     Active Problems:    Alcohol dependency ([facility] [address] (7/17/2020)      Symptomatic anemia (6/9/2021)      ISABELLA (acute kidney injury) ([facility] [address] (6/9/2021)      Prediabetes (6/9/2021)      Stage 2 chronic kidney disease (6/9/2021)      64 y.o. male with PMH including but not limited to ETOH abuse, pancreatitis, gastric and duodenal AVM's admitted w CP and hgb 6.0. Deneis overt GIB with last stool yesterday normal colored. No prior colo. EUS 7/17/20 with gastric AVM, ablated, duodenal AVM's x2 ablated, fatty liver, extensive pancreatic parenchymal and ductal abnormalities CW mild chronic ETOH pancreatitis.      Plan:     - NPO  - supportive care of pancreatitis, which appears to be resolving cliically  - EGD tomorrow to eval for UGIB sources  - ETOH abstinence discussed  - He will need a colonoscopy at some point as well (never had)    Arti Rosario NP  Patient is seen and examined in collaboration with Dr. Steff Good. Assessment and plan as per Dr. Steff Good.

## 2021-06-10 ENCOUNTER — ANESTHESIA (OUTPATIENT)
Dept: ENDOSCOPY | Age: 61
DRG: 439 | End: 2021-06-10

## 2021-06-10 PROBLEM — K85.90 ACUTE PANCREATITIS: Status: ACTIVE | Noted: 2021-06-10

## 2021-06-10 PROBLEM — K31.819 GASTRIC AND DUODENAL ANGIODYSPLASIA: Status: ACTIVE | Noted: 2021-06-10

## 2021-06-10 LAB
ALBUMIN SERPL-MCNC: 3.6 G/DL (ref 3.2–4.6)
ALBUMIN/GLOB SERPL: 0.9 {RATIO} (ref 1.2–3.5)
ALP SERPL-CCNC: 67 U/L (ref 50–136)
ALT SERPL-CCNC: 40 U/L (ref 12–65)
ANION GAP SERPL CALC-SCNC: 10 MMOL/L (ref 7–16)
AST SERPL-CCNC: 57 U/L (ref 15–37)
BILIRUB SERPL-MCNC: 1 MG/DL (ref 0.2–1.1)
BUN SERPL-MCNC: 22 MG/DL (ref 8–23)
CALCIUM SERPL-MCNC: 9 MG/DL (ref 8.3–10.4)
CHLORIDE SERPL-SCNC: 109 MMOL/L (ref 98–107)
CO2 SERPL-SCNC: 21 MMOL/L (ref 21–32)
CREAT SERPL-MCNC: 1.28 MG/DL (ref 0.8–1.5)
ERYTHROCYTE [DISTWIDTH] IN BLOOD BY AUTOMATED COUNT: 24.3 % (ref 11.9–14.6)
GLOBULIN SER CALC-MCNC: 4.1 G/DL (ref 2.3–3.5)
GLUCOSE BLD STRIP.AUTO-MCNC: 227 MG/DL (ref 65–100)
GLUCOSE BLD STRIP.AUTO-MCNC: 87 MG/DL (ref 65–100)
GLUCOSE BLD STRIP.AUTO-MCNC: 95 MG/DL (ref 65–100)
GLUCOSE SERPL-MCNC: 66 MG/DL (ref 65–100)
HCT VFR BLD AUTO: 25.5 % (ref 41.1–50.3)
HGB BLD-MCNC: 7.3 G/DL (ref 13.6–17.2)
LIPASE SERPL-CCNC: 284 U/L (ref 73–393)
MAGNESIUM SERPL-MCNC: 2.1 MG/DL (ref 1.8–2.4)
MCH RBC QN AUTO: 17.8 PG (ref 26.1–32.9)
MCHC RBC AUTO-ENTMCNC: 28.6 G/DL (ref 31.4–35)
MCV RBC AUTO: 62.3 FL (ref 79.6–97.8)
NRBC # BLD: 0.04 K/UL (ref 0–0.2)
PLATELET # BLD AUTO: 149 K/UL (ref 150–450)
PMV BLD AUTO: ABNORMAL FL (ref 9.4–12.3)
POTASSIUM SERPL-SCNC: 4.1 MMOL/L (ref 3.5–5.1)
PROT SERPL-MCNC: 7.7 G/DL (ref 6.3–8.2)
RBC # BLD AUTO: 4.09 M/UL (ref 4.23–5.6)
SERVICE CMNT-IMP: ABNORMAL
SERVICE CMNT-IMP: NORMAL
SERVICE CMNT-IMP: NORMAL
SODIUM SERPL-SCNC: 140 MMOL/L (ref 138–145)
WBC # BLD AUTO: 8.2 K/UL (ref 4.3–11.1)

## 2021-06-10 PROCEDURE — 82962 GLUCOSE BLOOD TEST: CPT

## 2021-06-10 PROCEDURE — 36415 COLL VENOUS BLD VENIPUNCTURE: CPT

## 2021-06-10 PROCEDURE — 74011250636 HC RX REV CODE- 250/636: Performed by: INTERNAL MEDICINE

## 2021-06-10 PROCEDURE — 74011000250 HC RX REV CODE- 250: Performed by: INTERNAL MEDICINE

## 2021-06-10 PROCEDURE — C9113 INJ PANTOPRAZOLE SODIUM, VIA: HCPCS | Performed by: INTERNAL MEDICINE

## 2021-06-10 PROCEDURE — 2709999900 HC NON-CHARGEABLE SUPPLY

## 2021-06-10 PROCEDURE — 83690 ASSAY OF LIPASE: CPT

## 2021-06-10 PROCEDURE — 65660000000 HC RM CCU STEPDOWN

## 2021-06-10 PROCEDURE — 74011250636 HC RX REV CODE- 250/636: Performed by: NURSE ANESTHETIST, CERTIFIED REGISTERED

## 2021-06-10 PROCEDURE — 74011000250 HC RX REV CODE- 250: Performed by: NURSE ANESTHETIST, CERTIFIED REGISTERED

## 2021-06-10 PROCEDURE — 2709999900 HC NON-CHARGEABLE SUPPLY: Performed by: INTERNAL MEDICINE

## 2021-06-10 PROCEDURE — 85027 COMPLETE CBC AUTOMATED: CPT

## 2021-06-10 PROCEDURE — 76040000025: Performed by: INTERNAL MEDICINE

## 2021-06-10 PROCEDURE — 80053 COMPREHEN METABOLIC PANEL: CPT

## 2021-06-10 PROCEDURE — 76060000031 HC ANESTHESIA FIRST 0.5 HR: Performed by: INTERNAL MEDICINE

## 2021-06-10 PROCEDURE — 0DJ08ZZ INSPECTION OF UPPER INTESTINAL TRACT, VIA NATURAL OR ARTIFICIAL OPENING ENDOSCOPIC: ICD-10-PCS | Performed by: INTERNAL MEDICINE

## 2021-06-10 PROCEDURE — 74011250637 HC RX REV CODE- 250/637: Performed by: INTERNAL MEDICINE

## 2021-06-10 PROCEDURE — 83735 ASSAY OF MAGNESIUM: CPT

## 2021-06-10 RX ORDER — PROPOFOL 10 MG/ML
INJECTION, EMULSION INTRAVENOUS
Status: DISCONTINUED | OUTPATIENT
Start: 2021-06-10 | End: 2021-06-10 | Stop reason: HOSPADM

## 2021-06-10 RX ORDER — GLYCOPYRROLATE 0.2 MG/ML
INJECTION INTRAMUSCULAR; INTRAVENOUS AS NEEDED
Status: DISCONTINUED | OUTPATIENT
Start: 2021-06-10 | End: 2021-06-10 | Stop reason: HOSPADM

## 2021-06-10 RX ORDER — PROPOFOL 10 MG/ML
INJECTION, EMULSION INTRAVENOUS AS NEEDED
Status: DISCONTINUED | OUTPATIENT
Start: 2021-06-10 | End: 2021-06-10 | Stop reason: HOSPADM

## 2021-06-10 RX ORDER — LIDOCAINE HYDROCHLORIDE 20 MG/ML
INJECTION, SOLUTION EPIDURAL; INFILTRATION; INTRACAUDAL; PERINEURAL AS NEEDED
Status: DISCONTINUED | OUTPATIENT
Start: 2021-06-10 | End: 2021-06-10 | Stop reason: HOSPADM

## 2021-06-10 RX ORDER — SODIUM CHLORIDE, SODIUM LACTATE, POTASSIUM CHLORIDE, CALCIUM CHLORIDE 600; 310; 30; 20 MG/100ML; MG/100ML; MG/100ML; MG/100ML
75 INJECTION, SOLUTION INTRAVENOUS CONTINUOUS
Status: DISCONTINUED | OUTPATIENT
Start: 2021-06-10 | End: 2021-06-11

## 2021-06-10 RX ADMIN — SODIUM CHLORIDE, SODIUM LACTATE, POTASSIUM CHLORIDE, AND CALCIUM CHLORIDE 75 ML/HR: 600; 310; 30; 20 INJECTION, SOLUTION INTRAVENOUS at 12:42

## 2021-06-10 RX ADMIN — Medication 10 ML: at 22:07

## 2021-06-10 RX ADMIN — PROPOFOL 70 MG: 10 INJECTION, EMULSION INTRAVENOUS at 12:49

## 2021-06-10 RX ADMIN — CHLORDIAZEPOXIDE HYDROCHLORIDE 25 MG: 25 CAPSULE ORAL at 15:01

## 2021-06-10 RX ADMIN — CHLORDIAZEPOXIDE HYDROCHLORIDE 25 MG: 25 CAPSULE ORAL at 22:08

## 2021-06-10 RX ADMIN — PANTOPRAZOLE SODIUM 40 MG: 40 INJECTION, POWDER, FOR SOLUTION INTRAVENOUS at 10:00

## 2021-06-10 RX ADMIN — PANTOPRAZOLE SODIUM 40 MG: 40 INJECTION, POWDER, FOR SOLUTION INTRAVENOUS at 22:08

## 2021-06-10 RX ADMIN — GLYCOPYRROLATE 0.1 MG: 0.2 INJECTION INTRAMUSCULAR; INTRAVENOUS at 12:49

## 2021-06-10 RX ADMIN — FOLIC ACID 1 MG: 1 TABLET ORAL at 14:28

## 2021-06-10 RX ADMIN — FERROUS SULFATE TAB 325 MG (65 MG ELEMENTAL FE) 325 MG: 325 (65 FE) TAB at 08:05

## 2021-06-10 RX ADMIN — SODIUM CHLORIDE, SODIUM LACTATE, POTASSIUM CHLORIDE, AND CALCIUM CHLORIDE 100 ML/HR: 600; 310; 30; 20 INJECTION, SOLUTION INTRAVENOUS at 00:30

## 2021-06-10 RX ADMIN — FERROUS SULFATE TAB 325 MG (65 MG ELEMENTAL FE) 325 MG: 325 (65 FE) TAB at 16:59

## 2021-06-10 RX ADMIN — Medication 10 ML: at 05:44

## 2021-06-10 RX ADMIN — SODIUM CHLORIDE, SODIUM LACTATE, POTASSIUM CHLORIDE, AND CALCIUM CHLORIDE 100 ML/HR: 600; 310; 30; 20 INJECTION, SOLUTION INTRAVENOUS at 09:25

## 2021-06-10 RX ADMIN — Medication 10 ML: at 14:27

## 2021-06-10 RX ADMIN — Medication 100 MG: at 08:04

## 2021-06-10 RX ADMIN — PROPOFOL 200 MCG/KG/MIN: 10 INJECTION, EMULSION INTRAVENOUS at 12:49

## 2021-06-10 RX ADMIN — CHLORDIAZEPOXIDE HYDROCHLORIDE 25 MG: 25 CAPSULE ORAL at 08:04

## 2021-06-10 RX ADMIN — LIDOCAINE HYDROCHLORIDE 100 MG: 20 INJECTION, SOLUTION EPIDURAL; INFILTRATION; INTRACAUDAL; PERINEURAL at 12:49

## 2021-06-10 RX ADMIN — Medication 1 TABLET: at 08:04

## 2021-06-10 NOTE — PROGRESS NOTES
Patient given Ativan 1 mg IV for alcohol withdrawal symptoms at 2250. Patient now resting quietly in bed.

## 2021-06-10 NOTE — PROGRESS NOTES
TRANSFER - IN REPORT:    Verbal report received from [address] [place] [address] RN(name) on Karoline Arroyo  being received from 502(unit) for ordered procedure      Report consisted of patients Situation, Background, Assessment and   Recommendations(SBAR). Information from the following report(s) SBAR and Kardex was reviewed with the receiving nurse. Opportunity for questions and clarification was provided. Assessment completed upon patients arrival to unit and care assumed.

## 2021-06-10 NOTE — PROGRESS NOTES
.  [facility] Progress Note             Date: 6/10/2021    GI Problem: Etoh pancreatitis, ABLA, and prior gastric and duodenal AVM's    History of Present Illness:  Patient is a 64 y.o. male who is seen in consultation for Etoh pancreatitis, ABLA, and prior gastric and duodenal AVM's. Hospital Medications:  Current Facility-Administered Medications   Medication Dose Route Frequency    lactated Ringers infusion  75 mL/hr IntraVENous CONTINUOUS    sodium chloride (NS) flush 5-10 mL  5-10 mL IntraVENous Q8H    sodium chloride (NS) flush 5-10 mL  5-10 mL IntraVENous PRN    0.9% sodium chloride infusion 250 mL  250 mL IntraVENous PRN    0.9% sodium chloride infusion 250 mL  250 mL IntraVENous PRN    pantoprazole (PROTONIX) 40 mg in 0.9% sodium chloride 10 mL injection  40 mg IntraVENous Q12H    sodium chloride (NS) flush 5-40 mL  5-40 mL IntraVENous Q8H    sodium chloride (NS) flush 5-40 mL  5-40 mL IntraVENous PRN    folic acid (FOLVITE) tablet 1 mg  1 mg Oral DAILY    thiamine HCL (B-1) tablet 100 mg  100 mg Oral DAILY    multivitamin, tx-iron-ca-min (THERA-M w/ IRON) tablet 1 Tablet  1 Tablet Oral DAILY    chlordiazePOXIDE (LIBRIUM) capsule 25 mg  25 mg Oral TID    [START ON 6/11/2021] chlordiazePOXIDE (LIBRIUM) capsule 25 mg  25 mg Oral BID    [START ON 6/14/2021] chlordiazePOXIDE (LIBRIUM) capsule 25 mg  25 mg Oral DAILY    [START ON 6/16/2021] chlordiazePOXIDE (LIBRIUM) capsule 25 mg  25 mg Oral DAILY PRN    ferrous sulfate tablet 325 mg  1 Tablet Oral BID WITH MEALS    LORazepam (ATIVAN) injection 1 mg  1 mg IntraVENous Q2H PRN       Objective:     Physical Exam:  Vitals:  Visit Vitals  BP (!) 153/73   Pulse 87   Temp 98.3 °F (36.8 °C)   Resp 20   Ht 5' 5\" (1.651 m)   Wt 74.8 kg (165 lb)   SpO2 99%   BMI 27.46 kg/m²       General: No acute distress. Skin:  Extremities and face reveal no rashes. No griffin erythema. No telangiectasias on the chest wall.   HEENT: Sclerae anicteric. No oral ulcers. No abnormal pigmentation of the lips. The neck is supple. Cardiovascular: Regular rate and rhythm. No murmurs, gallops, or rubs. Respiratory:  Comfortable breathing  With no accessory muscle use. Clear breath sounds with no wheezes, rales, or rhonchi. GI:  Abdomen nondistended, soft, and nontender. Normal active bowel sounds. No enlargement of the liver or spleen. No masses palpable. Musculoskeletal:  No pitting edema of the lower legs. Extremities have good range of motion. Neurological:  Gross memory appears intact. Patient is alert and oriented. Psychiatric:  Mood appears appropriate with judgement intact. Lymphatic:  No cervical or supraclavicular adenopathy.     Laboratory:    Recent Labs     06/10/21  0524   WBC 8.2   RBC 4.09*   HGB 7.3*   HCT 25.5*   *      Recent Labs     06/10/21  0524   GLU 66      K 4.1   *   CO2 21   BUN 22   CREA 1.28   CA 9.0     Recent Labs     06/09/21  0823   PTP 12.9   INR 1.0     Recent Labs     06/10/21  0524   AP 67   ALB 3.6   TP 7.7   LPSE 284       Assessment:       A 64 y.o. male with Etoh pancreatitis, ABLA, and prior gastric and duodenal AVM's      Plan:       EGD    Signed By: Sy Jacobs MD     [place] 10, 2021

## 2021-06-10 NOTE — PROGRESS NOTES
Physician Progress Note      Nicky Ohara  CSN #:                  [id number]  :                       1960  ADMIT DATE:       2021 8:37 AM  DISCH DATE:  RESPONDING  PROVIDER #:        Armando CABRERA MD          QUERY TEXT:    Patient admitted with pancreatitis and anemia. Noted documentation of  Acute Kidney Injury in H&P. In order to support the diagnosis of ISABELLA, please include additional clinical indicators in your documentation. Or please document if the diagnosis of ISABELLA has been ruled out after further study. The medical record reflects the following:  Risk Factors: CKD stage 2, dehydration  Clinical Indicators: Creatinine on admission 1.59 and has improved to 1.28. Documented baseline around 1.2  Treatment: Liter IVF bolus then continuous IVF. Thank Filippo Jacobson RN CDI  [phone]    Defined by Kidney Disease Improving Global Outcomes (KDIGO) clinical practice guideline for acute kidney injury:  -Increase in SCr by greater than or equal to 0.3 mg/dl within 48 hours; or  -Increase or decrease in SCr to greater than or equal to 1.5 times baseline, which is known or presumed to have occurred within the prior 7 days; or  -Urine volume < 0.5ml/kg/h for 6 hours  Options provided:  -- Acute kidney injury evidenced by, Please document evidence as well as baseline creatinine, if known.   -- Acute kidney injury ruled out after study  -- Other - I will add my own diagnosis  -- Disagree - Not applicable / Not valid  -- Disagree - Clinically unable to determine / Unknown  -- Refer to Clinical Documentation Reviewer    PROVIDER RESPONSE TEXT:    This patient has an acute kidney injury as evidenced by initial crreatnin on admissio is 1.59 which is higher than basleine of 1.2    Query created by: Kaylene Angel on 6/10/2021 8:11 AM      Electronically signed by:  Armando Mehta MD [facility] MD 6/10/2021 9:12 AM

## 2021-06-10 NOTE — ANESTHESIA PREPROCEDURE EVALUATION
Relevant Problems   NEUROLOGY   (+) Alcohol dependency (HCC)      RENAL FAILURE   (+) ISABELLA (acute kidney injury) (HCC)   (+) Stage 2 chronic kidney disease      HEMATOLOGY   (+) Severe anemia   (+) Symptomatic anemia       Anesthetic History   No history of anesthetic complications            Review of Systems / Medical History  Pertinent labs reviewed    Pulmonary          Smoker         Neuro/Psych   Within defined limits           Cardiovascular  Within defined limits                Exercise tolerance: >4 METS     GI/Hepatic/Renal         Renal disease: ARF      Comments: Pancreatitis  GI bleed Endo/Other  Within defined limits      Anemia (hgb 7.7 has received blood this admission, current blood bank band)     Other Findings   Comments: Alcohol abuse         Physical Exam    Airway  Mallampati: II  TM Distance: 4 - 6 cm  Neck ROM: normal range of motion   Mouth opening: Normal     Cardiovascular  Regular rate and rhythm,  S1 and S2 normal,  no murmur, click, rub, or gallop             Dental    Dentition: Poor dentition and Upper partial plate     Pulmonary  Breath sounds clear to auscultation               Abdominal  GI exam deferred       Other Findings            Anesthetic Plan    ASA: 2  Anesthesia type: total IV anesthesia          Induction: Intravenous  Anesthetic plan and risks discussed with: Patient

## 2021-06-10 NOTE — PROGRESS NOTES
[facility]ist Progress Note     Name:  Den Sims  Age:61 y.o. Sex:male   :  1960    MRN:  [id number]     Admit Date:  2021    Reason for Admission:  Symptomatic anemia [D64.9]    Hospital Course/Interval history:     Patient with past medical history of    gastric angiodysplasia,   fatty infiltration of liver,   Alcohol abuse  chronic pancreatitis     He presented to ER due to epigastric/chest pain with vomiting. He is an active smoker and alcohol drinker. He was found to have anemia with Hb of 6. He was given 1 unit of packed red blood cell. \" On review of old records, patient has EGD done in  and findings are as below:     1. Gastric and duodenal angiodysplasias. Treated.   2. Fatty infiltration of the liver.   3. Extensive pancreatic parenchymal and ductal abnormalities are consistent with mild chronic alcoholic pancreatitis. \"     Gi is consulted and has seen patient. Plan is for EGD. Subjective (06/10/21):    6/10/21   Patient reports abdominal pain. No fever. No shaking. No chills. No chest pain. No shortness of breath. Review of Systems: 14 point review of systems is otherwise negative with the exception of the elements mentioned above. Assessment & Plan     Pancreatitis   Likely acute on chronic   Due to alcohol abuse   NPO   IV fluid. PPI     Anemia   Likely from acute blood loss   PPI   History of angiodysplasia   For EGD as per GI service     Alcoholism   I advised patient to quit. Will give Librium, Thiamine and Folic acid. Prevention of alcohol withdrawal.     Wendy on CKD   From poor oral intake, volume depletion. ? GI bleeding   Improved with IV fluid gently. Monitor renal function and intake and output. Avoid nephrotoxic agents. Elevated liver enzymes   From alcoholism  I advised patient to quit. History of prediabetes   HbA1c of 6.3 last year   New HbA1c was ordered.      Diet:  DIET NPO  DVT PPx: SCD   Code status: Full Code  Disposition/Expected LOS: to be determined. Maybe in 1-2 days. I have discussed the plan of care with patient. Objective:     Patient Vitals for the past 24 hrs:   Temp Pulse Resp BP SpO2   06/10/21 0826 98.5 °F (36.9 °C) 91 18 (!) 145/83 99 %   06/10/21 0720  90      06/10/21 0400 99 °F (37.2 °C) 96 18 139/81 99 %   06/09/21 2358 99 °F (37.2 °C) 100 18 (!) 148/82 97 %   06/09/21 1919 99.9 °F (37.7 °C) 99 18 (!) 146/70 97 %   06/09/21 1513 97.8 °F (36.6 °C) 80 16 125/65 99 %   06/09/21 1305  87  (!) 154/83 99 %   06/09/21 1250  76  (!) 150/70 100 %   06/09/21 1234  78  (!) 144/76 100 %   06/09/21 1219  70  (!) 142/74 98 %     Oxygen Therapy  O2 Sat (%): 99 % (06/10/21 0826)  Pulse via Oximetry: 84 beats per minute (06/09/21 1305)  O2 Device: None (Room air) (06/10/21 0707)    Body mass index is 26.63 kg/m². Physical Exam:   General:  No acute distress, speaking in full sentences, no use of accessory muscles  Pale. No jaundice.    Lungs:  Clear to auscultation bilaterally   CV:  Regular rate and rhythm with normal S1 and S2   Abdomen:  Soft, mild tenderness at epigastric area, mildly distended, normoactive bowel sounds   Extremities:  No cyanosis clubbing or edema   Neuro:  Nonfocal, A&O x3   Psych:  Normal affect     Data Review:  I have reviewed all labs, meds, and studies from the last 24 hours:    Labs:    Recent Results (from the past 24 hour(s))   FERRITIN    Collection Time: 06/09/21 12:42 PM   Result Value Ref Range    Ferritin 11 8 - 388 NG/ML   FOLATE    Collection Time: 06/09/21 12:42 PM   Result Value Ref Range    Folate 18.6 (H) 3.1 - 17.5 ng/mL   IRON    Collection Time: 06/09/21 12:42 PM   Result Value Ref Range    Iron 26 (L) 35 - 150 ug/dL   LIPID PANEL    Collection Time: 06/09/21 12:42 PM   Result Value Ref Range    Cholesterol, total 213 (H) <200 MG/DL    Triglyceride 72 35 - 150 MG/DL    HDL Cholesterol 90 (H) 40 - 60 MG/DL    LDL, calculated 108.6 (H) <100 MG/DL VLDL, calculated 14.4 6.0 - 23.0 MG/DL    CHOL/HDL Ratio 2.4     T4, FREE    Collection Time: 06/09/21 12:42 PM   Result Value Ref Range    T4, Free 0.9 0.9 - 1.8 NG/DL   TSH 3RD GENERATION    Collection Time: 06/09/21 12:42 PM   Result Value Ref Range    TSH 0.777 0.358 - 3.740 uIU/mL   VITAMIN B12    Collection Time: 06/09/21 12:42 PM   Result Value Ref Range    Vitamin B12 572 193 - 986 pg/mL   CBC WITH AUTOMATED DIFF    Collection Time: 06/09/21  3:24 PM   Result Value Ref Range    WBC 7.1 4.3 - 11.1 K/uL    RBC 4.34 4.23 - 5.6 M/uL    HGB 7.7 (L) 13.6 - 17.2 g/dL    HCT 27.1 (L) 41.1 - 50.3 %    MCV 62.4 (L) 79.6 - 97.8 FL    MCH 17.7 (L) 26.1 - 32.9 PG    MCHC 28.4 (L) 31.4 - 35.0 g/dL    RDW 24.8 (H) 11.9 - 14.6 %    PLATELET 259 (L) 861 - 450 K/uL    MPV Unable to calculate. Recommend adding IPF. 9.4 - 12.3 FL    ABSOLUTE NRBC 0.03 0.0 - 0.2 K/uL    DF AUTOMATED      NEUTROPHILS 64 43 - 78 %    LYMPHOCYTES 20 13 - 44 %    MONOCYTES 14 (H) 4.0 - 12.0 %    EOSINOPHILS 2 0.5 - 7.8 %    BASOPHILS 1 0.0 - 2.0 %    IMMATURE GRANULOCYTES 1 0.0 - 5.0 %    ABS. NEUTROPHILS 4.5 1.7 - 8.2 K/UL    ABS. LYMPHOCYTES 1.4 0.5 - 4.6 K/UL    ABS. MONOCYTES 1.0 0.1 - 1.3 K/UL    ABS. EOSINOPHILS 0.1 0.0 - 0.8 K/UL    ABS. BASOPHILS 0.1 0.0 - 0.2 K/UL    ABS. IMM.  GRANS. 0.0 0.0 - 0.5 K/UL   GLUCOSE, POC    Collection Time: 06/09/21  4:17 PM   Result Value Ref Range    Glucose (POC) 101 (H) 65 - 100 mg/dL    Performed by [address] [place] [address], POC    Collection Time: 06/10/21 12:11 AM   Result Value Ref Range    Glucose (POC) 87 65 - 100 mg/dL    Performed by Ioana    METABOLIC PANEL, COMPREHENSIVE    Collection Time: 06/10/21  5:24 AM   Result Value Ref Range    Sodium 140 138 - 145 mmol/L    Potassium 4.1 3.5 - 5.1 mmol/L    Chloride 109 (H) 98 - 107 mmol/L    CO2 21 21 - 32 mmol/L    Anion gap 10 7 - 16 mmol/L    Glucose 66 65 - 100 mg/dL    BUN 22 8 - 23 MG/DL    Creatinine 1.28 0.8 - 1.5 MG/DL    GFR est AA >60 >60 ml/min/1.73m2    GFR est non-AA >60 >60 ml/min/1.73m2    Calcium 9.0 8.3 - 10.4 MG/DL    Bilirubin, total 1.0 0.2 - 1.1 MG/DL    ALT (SGPT) 40 12 - 65 U/L    AST (SGOT) 57 (H) 15 - 37 U/L    Alk. phosphatase 67 50 - 136 U/L    Protein, total 7.7 6.3 - 8.2 g/dL    Albumin 3.6 3.2 - 4.6 g/dL    Globulin 4.1 (H) 2.3 - 3.5 g/dL    A-G Ratio 0.9 (L) 1.2 - 3.5     LIPASE    Collection Time: 06/10/21  5:24 AM   Result Value Ref Range    Lipase 284 73 - 393 U/L   MAGNESIUM    Collection Time: 06/10/21  5:24 AM   Result Value Ref Range    Magnesium 2.1 1.8 - 2.4 mg/dL   CBC W/O DIFF    Collection Time: 06/10/21  5:24 AM   Result Value Ref Range    WBC 8.2 4.3 - 11.1 K/uL    RBC 4.09 (L) 4.23 - 5.6 M/uL    HGB 7.3 (L) 13.6 - 17.2 g/dL    HCT 25.5 (L) 41.1 - 50.3 %    MCV 62.3 (L) 79.6 - 97.8 FL    MCH 17.8 (L) 26.1 - 32.9 PG    MCHC 28.6 (L) 31.4 - 35.0 g/dL    RDW 24.3 (H) 11.9 - 14.6 %    PLATELET 402 (L) 350 - 450 K/uL    MPV Unable to calculate. Recommend adding IPF. 9.4 - 12.3 FL    ABSOLUTE NRBC 0.04 0.0 - 0.2 K/uL   GLUCOSE, POC    Collection Time: 06/10/21  7:54 AM   Result Value Ref Range    Glucose (POC) 95 65 - 100 mg/dL    Performed by Osito        All Micro Results     None          EKG Results     Procedure 720 Value Units Date/Time    EKG [[id number]] Collected: 06/09/21 0819    Order Status: Completed Updated: 06/09/21 1450     Ventricular Rate 98 BPM      Atrial Rate 98 BPM      P-R Interval 142 ms      QRS Duration 90 ms      Q-T Interval 354 ms      QTC Calculation (Bezet) 451 ms      Calculated P Axis 53 degrees      Calculated R Axis -4 degrees      Calculated T Axis 42 degrees      Diagnosis --     Normal sinus rhythm  Normal ECG  When compared with ECG of 16-JUL-2020 21:48,  No significant change was found  Confirmed by Gosia Bonilla ([id number]) on 6/9/2021 2:49:59 PM            Other Studies:  US ABD LTD    Result Date: 6/9/2021  Exam: Right upper quadrant ultrasound.  Indication: Elevated liver enzymes Comparison: Limited abdominal ultrasound, 7/17/2020 Findings: Pancreas: Not well visualized. Liver: Diffusely increased in echogenicity with mildly heterogeneous echotexture and smooth contour. No suspicious hepatic masses. The main portal vein is patent with an appropriate direction of flow. Gallbladder: Gallbladder wall is normal in thickness. The gallbladder is not distended. No evidence of cholelithiasis. No pericholecystic fluid. Absent sonographic Tim sign per the sonographer. Biliary: No intra or extrahepatic biliary duct dilatation. Right kidney: The right kidney is normal in echogenicity and normal in size measuring 10.8 cm. No contour deforming mass. No hydronephrosis or perinephric fluid. Abdominal Aorta: Nonaneurysmal in the visualized portion. Inferior Vena Cava: Patent. Ascites: None. Hepatic steatosis.        Current Meds:   Current Facility-Administered Medications   Medication Dose Route Frequency    sodium chloride (NS) flush 5-10 mL  5-10 mL IntraVENous Q8H    sodium chloride (NS) flush 5-10 mL  5-10 mL IntraVENous PRN    0.9% sodium chloride infusion 250 mL  250 mL IntraVENous PRN    lactated Ringers infusion  100 mL/hr IntraVENous CONTINUOUS    0.9% sodium chloride infusion 250 mL  250 mL IntraVENous PRN    pantoprazole (PROTONIX) 40 mg in 0.9% sodium chloride 10 mL injection  40 mg IntraVENous Q12H    sodium chloride (NS) flush 5-40 mL  5-40 mL IntraVENous Q8H    sodium chloride (NS) flush 5-40 mL  5-40 mL IntraVENous PRN    folic acid (FOLVITE) tablet 1 mg  1 mg Oral DAILY    thiamine HCL (B-1) tablet 100 mg  100 mg Oral DAILY    multivitamin, tx-iron-ca-min (THERA-M w/ IRON) tablet 1 Tablet  1 Tablet Oral DAILY    chlordiazePOXIDE (LIBRIUM) capsule 25 mg  25 mg Oral TID    [START ON 6/11/2021] chlordiazePOXIDE (LIBRIUM) capsule 25 mg  25 mg Oral BID    [START ON 6/14/2021] chlordiazePOXIDE (LIBRIUM) capsule 25 mg  25 mg Oral DAILY    [START ON 6/16/2021] chlordiazePOXIDE (LIBRIUM) capsule 25 mg  25 mg Oral DAILY PRN    ferrous sulfate tablet 325 mg  1 Tablet Oral BID WITH MEALS    LORazepam (ATIVAN) injection 1 mg  1 mg IntraVENous Q2H PRN       Problem List:  Hospital Problems as of 6/10/2021 Never Reviewed        Codes Class Noted - Resolved POA    * (Principal) Acute pancreatitis ICD-10-CM: K85.90  ICD-9-CM: 624.7  6/10/2021 - Present Unknown        Gastric and duodenal angiodysplasia ICD-10-CM: K31.819  ICD-9-CM: 537.82  6/10/2021 - Present Unknown        Symptomatic anemia ICD-10-CM: D64.9  ICD-9-CM: 285.9  6/9/2021 - Present Unknown        ISABELLA (acute kidney injury) ([facility] [place] [facility] ICD-10-CM: N17.9  ICD-9-CM: 584.9  6/9/2021 - Present Unknown        Prediabetes ICD-10-CM: R73.03  ICD-9-CM: 790.29  6/9/2021 - Present Unknown        Stage 2 chronic kidney disease ICD-10-CM: N18.2  ICD-9-CM: 585.2  6/9/2021 - Present Unknown        Alcohol dependency ([facility] [place] [facility] ICD-10-CM: F10.20  ICD-9-CM: 303.90  7/17/2020 - Present Yes                   Part of this note was written by using a voice dictation software and the note has been proof read but may still contain some grammatical/other typographical errors.     Signed By: Nohemi Cline MD   [facility]    [place] 10, 2021  5:15 PM

## 2021-06-10 NOTE — PROGRESS NOTES
Patient's CIWA score is an 11 and patient is agitated, has a tremor and states he is itching, sweating, and he feels like he is starting to go into withdrawals.  Dr. Tuan Cho notified and orders placed

## 2021-06-10 NOTE — PROCEDURES
Endoscopic Gastroduodenoscopy Procedure Note    Indications: Etoh pancreatitis, ABLA, and prior gastric and duodenal AVM's    Anesthesia/Sedation: MAC IV     Pre-Procedure Physical:    Current Facility-Administered Medications   Medication Dose Route Frequency    lactated Ringers infusion  75 mL/hr IntraVENous CONTINUOUS    sodium chloride (NS) flush 5-10 mL  5-10 mL IntraVENous Q8H    sodium chloride (NS) flush 5-10 mL  5-10 mL IntraVENous PRN    0.9% sodium chloride infusion 250 mL  250 mL IntraVENous PRN    0.9% sodium chloride infusion 250 mL  250 mL IntraVENous PRN    pantoprazole (PROTONIX) 40 mg in 0.9% sodium chloride 10 mL injection  40 mg IntraVENous Q12H    sodium chloride (NS) flush 5-40 mL  5-40 mL IntraVENous Q8H    sodium chloride (NS) flush 5-40 mL  5-40 mL IntraVENous PRN    folic acid (FOLVITE) tablet 1 mg  1 mg Oral DAILY    thiamine HCL (B-1) tablet 100 mg  100 mg Oral DAILY    multivitamin, tx-iron-ca-min (THERA-M w/ IRON) tablet 1 Tablet  1 Tablet Oral DAILY    chlordiazePOXIDE (LIBRIUM) capsule 25 mg  25 mg Oral TID    [START ON 6/11/2021] chlordiazePOXIDE (LIBRIUM) capsule 25 mg  25 mg Oral BID    [START ON 6/14/2021] chlordiazePOXIDE (LIBRIUM) capsule 25 mg  25 mg Oral DAILY    [START ON 6/16/2021] chlordiazePOXIDE (LIBRIUM) capsule 25 mg  25 mg Oral DAILY PRN    ferrous sulfate tablet 325 mg  1 Tablet Oral BID WITH MEALS    LORazepam (ATIVAN) injection 1 mg  1 mg IntraVENous Q2H PRN     Facility-Administered Medications Ordered in Other Encounters   Medication Dose Route Frequency    glycopyrrolate (ROBINUL) injection   IntraVENous PRN    lidocaine (PF) (XYLOCAINE) 20 mg/mL (2 %) injection   IntraVENous PRN    propofoL (DIPRIVAN) 10 mg/mL injection   IntraVENous PRN    propofoL (DIPRIVAN) 10 mg/mL injection   IntraVENous CONTINUOUS      Patient has no known allergies.     Patient Vitals for the past 8 hrs:   BP Temp Pulse Resp SpO2 Height Weight   06/10/21 1247 (!) 163/75  83 18 100 %     06/10/21 1234 (!) 153/73 98.3 °F (36.8 °C) 87 20 99 % 5' 5\" (1.651 m) 74.8 kg (165 lb)   06/10/21 1211 138/75 98.3 °F (36.8 °C) 85 17 99 %     06/10/21 0826 (!) 145/83 98.5 °F (36.9 °C) 91 18 99 %     06/10/21 0720   80           Exam      Airway: clear   Heart: normal S1and S2    Lungs: clear bilateral  Abdomen: soft, nontender, bowel sounds present and normal in all quads   Mental Status: awake, alert and oriented to person, place and time          Procedure Details     Informed consent was obtained for the procedure, including conscious sedation. Risks of pancreatitis, infection, perforation, hemorrhage, adverse drug reaction and aspiration were discussed. The patient was placed in the left lateral decubitus position. Based on the pre-procedure assessment, including review of the patient's medical history, medications, allergies, and review of systems, he had been deemed to be an appropriate candidate for conscious sedation; he was therefore sedated with the medications listed below. He was monitored continuously with ECG tracing, pulse oximetry, blood pressure monitoring, and direct observation. The EGD gastroscope was inserted into the mouth and advanced under direct vision to the second portion of the duodenum. A careful inspection was made as the gastroscope was withdrawn, including a retroflexed view of the proximal stomach; findings and interventions are described below. Appropriate photodocumentation was obtained. Findings:   Esophagus- Normal.  Stomach- Mild erosive antral gastritis. Duodenum- Normal.    Therapies: None    Specimens: None    Estimated Blood Loss: 0 cc           Complications:   None; patient tolerated the procedure well. Attending Attestation:  I performed the procedure. Impression:    Mild erosive antral gastritis. No AVM's seen in upper GI tract.     Recommendations:  Etoh abstinence

## 2021-06-10 NOTE — PROGRESS NOTES
Problem: Anemia Care Plan (Adult and Pediatric)  Goal: *Labs within defined limits  Outcome: Progressing Towards Goal. HGB remains above 7 this am. IV hydration. Problem: Pancreatitis  Goal: *Control of acute pain  Outcome: Progressing Towards Goal. Pt denies any symptoms this morning. John meds with sips water  Goal: *Optimize nutritional status  Outcome: Not Progressing Towards Goal .Currently NPO for EGD this afternoon.

## 2021-06-10 NOTE — PROGRESS NOTES
Problem: Falls - Risk of  Goal: *Absence of Falls  Description: Document Ritika Valdez Fall Risk and appropriate interventions in the flowsheet.   Outcome: Progressing Towards Goal  Note: Fall Risk Interventions:            Medication Interventions: Evaluate medications/consider consulting pharmacy, Patient to call before getting OOB, Teach patient to arise slowly                   Problem: Patient Education: Go to Patient Education Activity  Goal: Patient/Family Education  Outcome: Progressing Towards Goal     Problem: Anemia Care Plan (Adult and Pediatric)  Goal: *Labs within defined limits  Outcome: Progressing Towards Goal  Goal: *Tolerates increased activity  Outcome: Progressing Towards Goal     Problem: Patient Education: Go to Patient Education Activity  Goal: Patient/Family Education  Outcome: Progressing Towards Goal     Problem: Pancreatitis  Goal: *Control of acute pain  Outcome: Progressing Towards Goal  Goal: *Absence of nausea/vomiting  Outcome: Progressing Towards Goal  Goal: *Optimize nutritional status  Outcome: Progressing Towards Goal  Goal: *Labs within defined limits  Outcome: Progressing Towards Goal     Problem: Patient Education: Go to Patient Education Activity  Goal: Patient/Family Education  Outcome: Progressing Towards Goal

## 2021-06-10 NOTE — ROUTINE PROCESS
TRANSFER - OUT REPORT: 
 
Verbal report given to [address] [place] [address] RN(name) on Santa Mcqueen  being transferred to [facility](unit) for routine post - op Report consisted of patients Situation, Background, Assessment and  
Recommendations(SBAR). Information from the following report(s) SBAR and Procedure Summary was reviewed with the receiving nurse. Lines:  
Peripheral IV 06/09/21 Right Forearm (Active) Site Assessment Clean, dry, & intact 06/10/21 1236 Phlebitis Assessment 0 06/10/21 1236 Infiltration Assessment 0 06/10/21 1236 Dressing Status Clean, dry, & intact 06/10/21 1236 Dressing Type Tape;Transparent 06/10/21 1236 Hub Color/Line Status Pink 06/10/21 1236 Opportunity for questions and clarification was provided. Patient transported with: 
 [employer]

## 2021-06-11 LAB
GLUCOSE BLD STRIP.AUTO-MCNC: 110 MG/DL (ref 65–100)
SERVICE CMNT-IMP: ABNORMAL

## 2021-06-11 PROCEDURE — 74011250636 HC RX REV CODE- 250/636: Performed by: INTERNAL MEDICINE

## 2021-06-11 PROCEDURE — 74011000250 HC RX REV CODE- 250: Performed by: INTERNAL MEDICINE

## 2021-06-11 PROCEDURE — C9113 INJ PANTOPRAZOLE SODIUM, VIA: HCPCS | Performed by: INTERNAL MEDICINE

## 2021-06-11 PROCEDURE — 74011250637 HC RX REV CODE- 250/637: Performed by: INTERNAL MEDICINE

## 2021-06-11 PROCEDURE — 82962 GLUCOSE BLOOD TEST: CPT

## 2021-06-11 RX ADMIN — CHLORDIAZEPOXIDE HYDROCHLORIDE 25 MG: 25 CAPSULE ORAL at 08:26

## 2021-06-11 RX ADMIN — PANTOPRAZOLE SODIUM 40 MG: 40 INJECTION, POWDER, FOR SOLUTION INTRAVENOUS at 08:26

## 2021-06-11 RX ADMIN — Medication 100 MG: at 08:26

## 2021-06-11 RX ADMIN — Medication 10 ML: at 05:37

## 2021-06-11 RX ADMIN — FERROUS SULFATE TAB 325 MG (65 MG ELEMENTAL FE) 325 MG: 325 (65 FE) TAB at 08:26

## 2021-06-11 RX ADMIN — FOLIC ACID 1 MG: 1 TABLET ORAL at 08:26

## 2021-06-11 RX ADMIN — Medication 1 TABLET: at 08:26

## 2021-06-11 NOTE — PROGRESS NOTES
[facility]ist Progress Note     Name:  Mathias Cockayne  Age:61 y.o. Sex:male   :  1960    MRN:  [id number]     Admit Date:  2021    Reason for Admission:  Symptomatic anemia [D64.9]    Hospital Course/Interval history:     Patient with past medical history of    gastric angiodysplasia,   fatty infiltration of liver,   Alcohol abuse  chronic pancreatitis     He presented to ER due to epigastric/chest pain with vomiting. He is an active smoker and alcohol drinker. He was found to have anemia with Hb of 6. He was given 1 unit of packed red blood cell. \" On review of old records, patient has EGD done in  and findings are as below:     1. Gastric and duodenal angiodysplasias. Treated.   2. Fatty infiltration of the liver.   3. Extensive pancreatic parenchymal and ductal abnormalities are consistent with mild chronic alcoholic pancreatitis. \"     Gi is consulted and has seen patient. Patient had EGD on 6/10/2021 and was found to have Mild erosive antral gastritis. No AVM's seen in upper GI tract. Subjective (21):    6/10/21   Patient reports abdominal pain. No fever. No shaking. No chills. No chest pain. No shortness of breath. 21   Feeling better. Tolerating oral intake. No abdominal pain. Review of Systems: 14 point review of systems is otherwise negative with the exception of the elements mentioned above. Assessment & Plan     Pancreatitis   Likely acute on chronic   Due to alcohol abuse   Oral intake is allowed. Advance diet as tolerated. PPI     Anemia   Likely from acute blood loss   PPI   History of angiodysplasia   Hb is 7.3 from 6.0 after transfusion. Alcoholism   I advised patient to quit. On Librium, Thiamine and Folic acid. Prevention of alcohol withdrawal.     ISABELLA on CKD   From poor oral intake, volume depletion. ? From GI bleeding   Improved with IV fluid gently. Monitor renal function and intake and output.    Avoid nephrotoxic agents. Elevated liver enzymes   From alcoholism  I advised patient to quit alcohol use. History of prediabetes   HbA1c of 6.3 last year   New HbA1c was ordered. Diet:  DIET ADULT  DVT PPx: SCD   Code status: Full Code  Disposition/Expected LOS: to be determined. Maybe in 1-2 days. I have discussed the plan of care with patient. Objective:     Patient Vitals for the past 24 hrs:   Temp Pulse Resp BP SpO2   06/11/21 0729 99.2 °F (37.3 °C) 73 17 (!) 148/81 96 %   06/11/21 0313 97.9 °F (36.6 °C) 79 18 (!) 151/80 96 %   06/10/21 2351 98.2 °F (36.8 °C) 89 18 135/72 95 %   06/10/21 2303  79      06/10/21 1930 98 °F (36.7 °C) 85 18 138/80 98 %   06/10/21 1651 98 °F (36.7 °C) 99 17 138/78 98 %   06/10/21 1536  (!) 104      06/10/21 1318  90 12 (!) 164/81 99 %   06/10/21 1308  (!) 101 12 (!) 162/79 99 %   06/10/21 1300 97.7 °F (36.5 °C) 98 14 (!) 135/112 (!) 88 %   06/10/21 1247  83 18 (!) 163/75 100 %   06/10/21 1234 98.3 °F (36.8 °C) 87 20 (!) 153/73 99 %     Oxygen Therapy  O2 Sat (%): 96 % (06/11/21 0729)  Pulse via Oximetry: 90 beats per minute (06/10/21 1318)  O2 Device: None (Room air) (06/11/21 0826)    Body mass index is 27.46 kg/m². Physical Exam:   General:  No acute distress, speaking in full sentences, no use of accessory muscles  Pale. No jaundice.    Lungs:  Clear to auscultation bilaterally   CV:  Regular rate and rhythm with normal S1 and S2   Abdomen:  Soft, no tenderness at epigastric area, mildly distended, normoactive bowel sounds   Extremities:  No cyanosis clubbing or edema   Neuro:  Nonfocal, A&O x3   Psych:  Normal affect     Data Review:  I have reviewed all labs, meds, and studies from the last 24 hours:    Labs:    Recent Results (from the past 24 hour(s))   GLUCOSE, POC    Collection Time: 06/10/21  4:36 PM   Result Value Ref Range    Glucose (POC) 227 (H) 65 - 100 mg/dL    Performed by [address] [place] [address], POC    Collection Time: 06/11/21  7:19 AM Result Value Ref Range    Glucose (POC) 110 (H) 65 - 100 mg/dL    Performed by Raymond        All Micro Results     None          EKG Results     Procedure 720 Value Units Date/Time    EKG [[id number]] Collected: 06/09/21 0819    Order Status: Completed Updated: 06/09/21 1450     Ventricular Rate 98 BPM      Atrial Rate 98 BPM      P-R Interval 142 ms      QRS Duration 90 ms      Q-T Interval 354 ms      QTC Calculation (Bezet) 451 ms      Calculated P Axis 53 degrees      Calculated R Axis -4 degrees      Calculated T Axis 42 degrees      Diagnosis --     Normal sinus rhythm  Normal ECG  When compared with ECG of 16-JUL-2020 21:48,  No significant change was found  Confirmed by Anisa Eye ([id number]) on 6/9/2021 2:49:59 PM            Other Studies:  No results found.     Current Meds:   Current Facility-Administered Medications   Medication Dose Route Frequency    sodium chloride (NS) flush 5-10 mL  5-10 mL IntraVENous Q8H    sodium chloride (NS) flush 5-10 mL  5-10 mL IntraVENous PRN    0.9% sodium chloride infusion 250 mL  250 mL IntraVENous PRN    0.9% sodium chloride infusion 250 mL  250 mL IntraVENous PRN    pantoprazole (PROTONIX) 40 mg in 0.9% sodium chloride 10 mL injection  40 mg IntraVENous Q12H    sodium chloride (NS) flush 5-40 mL  5-40 mL IntraVENous Q8H    sodium chloride (NS) flush 5-40 mL  5-40 mL IntraVENous PRN    folic acid (FOLVITE) tablet 1 mg  1 mg Oral DAILY    thiamine HCL (B-1) tablet 100 mg  100 mg Oral DAILY    multivitamin, tx-iron-ca-min (THERA-M w/ IRON) tablet 1 Tablet  1 Tablet Oral DAILY    chlordiazePOXIDE (LIBRIUM) capsule 25 mg  25 mg Oral BID    [START ON 6/14/2021] chlordiazePOXIDE (LIBRIUM) capsule 25 mg  25 mg Oral DAILY    [START ON 6/16/2021] chlordiazePOXIDE (LIBRIUM) capsule 25 mg  25 mg Oral DAILY PRN    ferrous sulfate tablet 325 mg  1 Tablet Oral BID WITH MEALS    LORazepam (ATIVAN) injection 1 mg  1 mg IntraVENous Q2H PRN       Problem List:  Hospital Problems as of 6/11/2021 Never Reviewed        Codes Class Noted - Resolved POA    * (Principal) Acute pancreatitis ICD-10-CM: K85.90  ICD-9-CM: 730.8  6/10/2021 - Present Unknown        Gastric and duodenal angiodysplasia ICD-10-CM: K31.819  ICD-9-CM: 537.82  6/10/2021 - Present Unknown        Symptomatic anemia ICD-10-CM: D64.9  ICD-9-CM: 285.9  6/9/2021 - Present Unknown        ISABELLA (acute kidney injury) ([facility] ICD-10-CM: N17.9  ICD-9-CM: 584.9  6/9/2021 - Present Unknown        Prediabetes ICD-10-CM: R73.03  ICD-9-CM: 790.29  6/9/2021 - Present Unknown        Stage 2 chronic kidney disease ICD-10-CM: N18.2  ICD-9-CM: 585.2  6/9/2021 - Present Unknown        Alcohol dependency ([facility] ICD-10-CM: F10.20  ICD-9-CM: 303.90  7/17/2020 - Present Yes                   Part of this note was written by using a voice dictation software and the note has been proof read but may still contain some grammatical/other typographical errors.     Signed By: Francie Flynn MD   [facility]    June 11, 2021  5:15 PM

## 2021-06-11 NOTE — DISCHARGE SUMMARY
[facility] Hospitalist Discharge Summary     Name:  Padma Reyes    Age:61 y.o. Sex:male  :  1960       MRN:  [id number]       Admitting Physician: Manny Madden MD Admit Date: 2021  8:37 AM   Attending Physician: Diana Baltazar MD  Primary Care Physician: Unknown, Provider       Discharge Physician: Deanna Logan MD  Discharge date: 21   Discharged Condition: Stable    Indication for Admission:   Chief Complaint   Patient presents with    Chest Pain        Reasons for hospitalization:  Hospital Problems as of 2021 Never Reviewed        Codes Class Noted - Resolved POA    * (Principal) Acute pancreatitis ICD-10-CM: K85.90  ICD-9-CM: 459.6  6/10/2021 - Present Unknown        Gastric and duodenal angiodysplasia ICD-10-CM: K31.819  ICD-9-CM: 537.82  6/10/2021 - Present Unknown        Symptomatic anemia ICD-10-CM: D64.9  ICD-9-CM: 285.9  2021 - Present Unknown        ISABELLA (acute kidney injury) ([facility] ICD-10-CM: N17.9  ICD-9-CM: 584.9  2021 - Present Unknown        Prediabetes ICD-10-CM: R73.03  ICD-9-CM: 790.29  2021 - Present Unknown        Stage 2 chronic kidney disease ICD-10-CM: N18.2  ICD-9-CM: 585.2  2021 - Present Unknown        Alcohol dependency ([facility] ICD-10-CM: F10.20  ICD-9-CM: 303.90  2020 - Present Yes                 Discharge Diagnosis: alcoholic pancreatitis  Did Patient have Sepsis (YES OR NO): no      Hospital Course/Interval history:    Patient with past medical history of    gastric angiodysplasia,   fatty infiltration of liver,   Alcohol abuse  chronic pancreatitis      He presented to ER due to epigastric/chest pain with vomiting. He is an active smoker and alcohol drinker.       He was found to have anemia with Hb of 6. He was given 1 unit of packed red blood cell.      \" On review of old records, patient has EGD done in  and findings are as below:     1. Gastric and duodenal angiodysplasias.  Treated.   2. Fatty infiltration of the liver.   3. Extensive pancreatic parenchymal and ductal abnormalities are consistent with mild chronic alcoholic pancreatitis. \"      Gi is consulted and has seen patient. Patient had EGD on 6/10/2021 and was found to have Mild erosive antral gastritis.    No AVM's seen in upper GI tract.     Patient tolerated regular diet and with no abdominal pain. He is advised to stop drinking alcohol. Assessment/Plan:  Follow up with his physician. Consults:   IP CONSULT TO GASTROENTEROLOGY     Disposition: Home or Self Care  Diet:   DIET ADULT  Code Status: Full Code      Follow up labs/imaging: -   Follow up with primary doctor in 3-5 days. Pending labs/studies: -     Current Discharge Medication List      CONTINUE these medications which have NOT CHANGED    Details   folic acid (FOLVITE) 1 mg tablet Take 1 Tab by mouth daily. Qty: 30 Tab, Refills: 0      thiamine HCL (B-1) 100 mg tablet Take 1 Tab by mouth daily. Qty: 30 Tab, Refills: 0      ferrous sulfate (IRON) 325 mg (65 mg iron) EC tablet Take 1 Tab by mouth Daily (before breakfast). Qty: 30 Tab, Refills: 0      pantoprazole (PROTONIX) 40 mg tablet Take 1 Tab by mouth daily. Qty: 60 Tab, Refills: 0             Medications Discontinued During This Encounter   Medication Reason    pantoprazole (PROTONIX) 80 mg in 0.9% sodium chloride 100 mL infusion     pantoprazole (PROTONIX) 40 mg in 0.9% sodium chloride 10 mL injection REORDER    insulin lispro (HUMALOG) injection     lactated Ringers infusion     lactated Ringers infusion     0.9% sodium chloride infusion 250 mL          Follow Up Orders: Follow-up Appointments   Procedures    FOLLOW UP VISIT Appointment in: 3 - 5 Days See your primary doctor. See your primary doctor.      Standing Status:   Standing     Number of Occurrences:   1     Order Specific Question:   Appointment in     Answer:   3 - 5 Days         Follow-up Information     Follow up With Specialties Details Why Contact Info Unknown, Provider    Patient not available to ask              Discharge Exam:    Patient Vitals for the past 24 hrs:   Temp Pulse Resp BP SpO2   06/11/21 1157 98.6 °F (37 °C) 92 18 (!) 153/85 97 %   06/11/21 0729 99.2 °F (37.3 °C) 73 17 (!) 148/81 96 %   06/11/21 0313 97.9 °F (36.6 °C) 79 18 (!) 151/80 96 %   06/10/21 2351 98.2 °F (36.8 °C) 89 18 135/72 95 %   06/10/21 2303  79      06/10/21 1930 98 °F (36.7 °C) 85 18 138/80 98 %   06/10/21 1651 98 °F (36.7 °C) 99 17 138/78 98 %   06/10/21 1536  (!) 104      06/10/21 1318  90 12 (!) 164/81 99 %     Oxygen Therapy  O2 Sat (%): 97 % (06/11/21 1157)  Pulse via Oximetry: 90 beats per minute (06/10/21 1318)  O2 Device: None (Room air) (06/11/21 0826)    Estimated body mass index is 27.46 kg/m² as calculated from the following:    Height as of this encounter: 5' 5\" (1.651 m). Weight as of this encounter: 74.8 kg (165 lb). Intake/Output Summary (Last 24 hours) at 6/11/2021 1315  Last data filed at 6/11/2021 0317  Gross per 24 hour   Intake 920 ml   Output 950 ml   Net -30 ml       *Note that automatically entered I/Os may not be accurate; dependent on patient compliance with collection and accurate  by assistants.     Physical Exam:   General:  No acute distress, speaking in full sentences, no use of accessory muscles   HEENT:  Pupils equal and reactive to light and accommodation, oropharynx is clear   Neck:   Supple, no lymphadenopathy, no JVD   Lungs:  Clear to auscultation bilaterally   CV:  Regular rate and rhythm with normal S1 and S2   Abdomen: Soft, nontender, nondistended, normoactive bowel sounds   Extremities:  No cyanosis clubbing or edema   Neuro:  Nonfocal, A&O x3   Psych:  Normal affect       All Labs from Last 24 Hrs:  Recent Results (from the past 24 hour(s))   GLUCOSE, POC    Collection Time: 06/10/21  4:36 PM   Result Value Ref Range    Glucose (POC) 227 (H) 65 - 100 mg/dL    Performed by [address] [place] [address] Collection Time: 06/11/21  7:19 AM   Result Value Ref Range    Glucose (POC) 110 (H) 65 - 100 mg/dL    Performed by [employer]        All Micro Results     None          SARS-CoV-2 Lab Results  \"Novel Coronavirus\" Test: No results found for: COV2NT   \"Emergent Disease\" Test: No results found for: EDPR  \"SARS-COV-2\" Test: No results found for: XGCOVT  Rapid Test:   No results found for: COVR         Diagnostic Imaging/Tests:   XR CHEST PA LAT    Result Date: 6/9/2021  No acute cardiopulmonary abnormality. US ABD LTD    Result Date: 6/9/2021  Hepatic steatosis. Echocardiogram/EKG results:  No results found for this visit on 06/09/21.     EKG Results     Procedure 720 Value Units Date/Time    EKG [[id number]] Collected: 06/09/21 0819    Order Status: Completed Updated: 06/09/21 1450     Ventricular Rate 98 BPM      Atrial Rate 98 BPM      P-R Interval 142 ms      QRS Duration 90 ms      Q-T Interval 354 ms      QTC Calculation (Bezet) 451 ms      Calculated P Axis 53 degrees      Calculated R Axis -4 degrees      Calculated T Axis 42 degrees      Diagnosis --     Normal sinus rhythm  Normal ECG  When compared with ECG of 16-JUL-2020 21:48,  No significant change was found  Confirmed by Princess Daniels ([id number]) on 6/9/2021 2:49:59 PM            Results for orders placed or performed during the hospital encounter of 06/09/21   EKG, 12 LEAD, INITIAL   Result Value Ref Range    Ventricular Rate 98 BPM    Atrial Rate 98 BPM    P-R Interval 142 ms    QRS Duration 90 ms    Q-T Interval 354 ms    QTC Calculation (Bezet) 451 ms    Calculated P Axis 53 degrees    Calculated R Axis -4 degrees    Calculated T Axis 42 degrees    Diagnosis       Normal sinus rhythm  Normal ECG  When compared with ECG of 16-JUL-2020 21:48,  No significant change was found  Confirmed by Princess Daniels ([id number]) on 6/9/2021 2:49:59 PM         Procedures done this admission:  Procedure(s):  ESOPHAGOGASTRODUODENOSCOPY (EGD)/ BMMI 27 ROOM 502        Time spent in patient discharge planning and coordination 37 minutes.       Signed By: Taylor Ferguson MD   [facility]    June 11, 2021  1:15 PM

## 2021-06-11 NOTE — PROGRESS NOTES
Pt to d/c home today. He requires [employer]trip to provide transportation home -  [address] [employer]. He is on RA. Tolerated regular diet at lunch without difficulty. ED CM provided pt with information for the [facility] as well as [address]. Per progress notes pt has been provided these resources each time he has been d/c from this hospital but has failed to f/u. CM encouraged pt to f/u after this d/c.  CM also provided pt with information about the [facility]. CM spoke witih pt about referring him to an ETOH treatment program and he refused twice. No other supportive care needs identified. CM will continue to follow plan of care. Care Management Interventions  PCP Verified by CM: No (No PCP)  Mode of Transport at Discharge: Other (see comment) (Roundtrip)  Transition of Care Consult (CM Consult): Discharge Planning  Discharge Durable Medical Equipment: No  Physical Therapy Consult: No  Occupational Therapy Consult: No  Speech Therapy Consult: No  Current Support Network: Lives with Caregiver, Relative's Home (Resides in an apartment with sister)  Confirm Follow Up Transport: Other (see comment) (Roundtrip)  The Plan for Transition of Care is Related to the Following Treatment Goals : Pt to obtain care to become medically stable and to return with a safe transition.   The Patient and/or Patient Representative was Provided with a Choice of Provider and Agrees with the Discharge Plan?: Yes  Name of the Patient Representative Who was Provided with a Choice of Provider and Agrees with the Discharge Plan: Janelle Briseno  [place] of Choice List was Provided with Basic Dialogue that Supports the Patient's Individualized Plan of Care/Goals, Treatment Preferences and Shares the Quality Data Associated with the Providers?: Yes   Resource Information Provided?: No  Discharge Location  Discharge Placement: Home

## 2021-06-11 NOTE — PROGRESS NOTES
Problem: Falls - Risk of  Goal: *Absence of Falls  Description: Document [place] Fall Risk and appropriate interventions in the flowsheet.   Outcome: Progressing Towards Goal  Note: Fall Risk Interventions:            Medication Interventions: Evaluate medications/consider consulting pharmacy, Patient to call before getting OOB, Teach patient to arise slowly                   Problem: Anemia Care Plan (Adult and Pediatric)  Goal: *Labs within defined limits  Outcome: Progressing Towards Goal  Goal: *Tolerates increased activity  Outcome: Progressing Towards Goal     Problem: Pancreatitis  Goal: *Control of acute pain  Outcome: Progressing Towards Goal  Goal: *Absence of nausea/vomiting  Outcome: Progressing Towards Goal  Goal: *Optimize nutritional status  Outcome: Progressing Towards Goal  Goal: *Labs within defined limits  Outcome: Progressing Towards Goal     Problem: Patient Education: Go to Patient Education Activity  Goal: Patient/Family Education  Outcome: Progressing Towards Goal

## 2021-06-11 NOTE — PROGRESS NOTES
Chart screened by CM for d/c planning. Pt is on RA. Pt had an EGD on 6-10-21 which showed mild erosive antral gastritis. Per progress notes pt has been advised multiple times to cease ETOH use. Pt continues to have ETOH withdrawls for which he is prescribed Librium, Thiamine, and Folic acid. Pt's diet has been upgraded from NPO to clear liquid. Current d/c plan remains for pt to return home to his sister's apartment when medically stable. CM met with pt at the bedside (proper social distancing and PPE observed) to re-iterated the importance of f/u with the [facility] or [facility]. CM provided pt with an informational flyer and calendars for June and July for the [facility]. CM also discussed ETOH treatment programs with pt but he stated \"Nah, I''m good. They told me I gotta quit drinking. \"  When CM again inquired about assisting pt with professional assistance for sobriety he again declined and stated \"Nah I don't need that. No.\"  CM will continue to follow and remain available if any needs arise.

## 2021-06-11 NOTE — DISCHARGE INSTRUCTIONS
If worsened, call your doctor or return to hospital.   When follow up with your doctor, make sure that your doctor is aware of this admission and review hospital record and follow up on lab or other results for continuity of care. Also, review your medications with your doctor for possible need of adjustment or refills.

## 2021-06-11 NOTE — PROGRESS NOTES
.  [facility] Progress Note             Date: 6/11/2021    GI Problem: Etoh pancreatitis, anemia    History of Present Illness:  Patient is a 64 y.o. male who is seen in consultation for Etoh pancreatitis. Now no more n/v or epigastric pain. Tolerated clear liquid diet well. Hgb was 6.0 (now 7.7). EGD found just gastritis. Has never had colonoscopy before. Hospital Medications:  Current Facility-Administered Medications   Medication Dose Route Frequency    lactated Ringers infusion  75 mL/hr IntraVENous CONTINUOUS    sodium chloride (NS) flush 5-10 mL  5-10 mL IntraVENous Q8H    sodium chloride (NS) flush 5-10 mL  5-10 mL IntraVENous PRN    0.9% sodium chloride infusion 250 mL  250 mL IntraVENous PRN    0.9% sodium chloride infusion 250 mL  250 mL IntraVENous PRN    pantoprazole (PROTONIX) 40 mg in 0.9% sodium chloride 10 mL injection  40 mg IntraVENous Q12H    sodium chloride (NS) flush 5-40 mL  5-40 mL IntraVENous Q8H    sodium chloride (NS) flush 5-40 mL  5-40 mL IntraVENous PRN    folic acid (FOLVITE) tablet 1 mg  1 mg Oral DAILY    thiamine HCL (B-1) tablet 100 mg  100 mg Oral DAILY    multivitamin, tx-iron-ca-min (THERA-M w/ IRON) tablet 1 Tablet  1 Tablet Oral DAILY    chlordiazePOXIDE (LIBRIUM) capsule 25 mg  25 mg Oral BID    [START ON 6/14/2021] chlordiazePOXIDE (LIBRIUM) capsule 25 mg  25 mg Oral DAILY    [START ON 6/16/2021] chlordiazePOXIDE (LIBRIUM) capsule 25 mg  25 mg Oral DAILY PRN    ferrous sulfate tablet 325 mg  1 Tablet Oral BID WITH MEALS    LORazepam (ATIVAN) injection 1 mg  1 mg IntraVENous Q2H PRN       Objective:     Physical Exam:  Vitals:  Visit Vitals  BP (!) 148/81 (BP 1 Location: Left upper arm, BP Patient Position: At rest;Supine)   Pulse 73   Temp 99.2 °F (37.3 °C)   Resp 17   Ht 5' 5\" (1.651 m)   Wt 74.8 kg (165 lb)   SpO2 96%   BMI 27.46 kg/m²       General: No acute distress. Skin:  Extremities and face reveal no rashes.  No griffin erythema. No telangiectasias on the chest wall. HEENT: Sclerae anicteric. No oral ulcers. No abnormal pigmentation of the lips. The neck is supple. Cardiovascular: Regular rate and rhythm. No murmurs, gallops, or rubs. Respiratory:  Comfortable breathing  With no accessory muscle use. Clear breath sounds with no wheezes, rales, or rhonchi. GI:  Abdomen nondistended, soft, and nontender. Normal active bowel sounds. No enlargement of the liver or spleen. No masses palpable. Musculoskeletal:  No pitting edema of the lower legs. Extremities have good range of motion. Neurological:  Gross memory appears intact. Patient is alert and oriented. Psychiatric:  Mood appears appropriate with judgement intact. Lymphatic:  No cervical or supraclavicular adenopathy. Laboratory:    Recent Labs     06/10/21  0524   WBC 8.2   RBC 4.09*   HGB 7.3*   HCT 25.5*   *      Recent Labs     06/10/21  0524   GLU 66      K 4.1   *   CO2 21   BUN 22   CREA 1.28   CA 9.0     Recent Labs     06/09/21 0823   PTP 12.9   INR 1.0     Recent Labs     06/10/21  0524   AP 67   ALB 3.6   TP 7.7   LPSE 284       Assessment:       A 64 y.o. male with Etoh pancreatitis now pretty much resolved with anemia but negative EGD.       Plan:       Advance diet  Suspect ok to go home  Will arrange outpt colonoscopy  Etoh abstinence  Will sign off      Signed By: Trisha Stringer MD     June 11, 2021

## 2021-06-12 LAB
ABO + RH BLD: NORMAL
BLD PROD TYP BPU: NORMAL
BLOOD BANK CMNT PATIENT-IMP: NORMAL
BLOOD GROUP ANTIBODIES SERPL: NORMAL
BPU ID: NORMAL
CROSSMATCH RESULT,%XM: NORMAL
SPECIMEN EXP DATE BLD: NORMAL
STATUS OF UNIT,%ST: NORMAL
UNIT DIVISION, %UDIV: 0

## 2021-06-18 VITALS
OXYGEN SATURATION: 97 % | TEMPERATURE: 98.6 F | HEART RATE: 92 BPM | BODY MASS INDEX: 27.49 KG/M2 | DIASTOLIC BLOOD PRESSURE: 85 MMHG | RESPIRATION RATE: 18 BRPM | HEIGHT: 65 IN | WEIGHT: 165 LBS | SYSTOLIC BLOOD PRESSURE: 153 MMHG

## 2021-08-21 ENCOUNTER — HOSPITAL ENCOUNTER (EMERGENCY)
Age: 61
Discharge: HOME | End: 2021-08-21
Attending: EMERGENCY MEDICINE

## 2021-08-21 VITALS
SYSTOLIC BLOOD PRESSURE: 148 MMHG | DIASTOLIC BLOOD PRESSURE: 91 MMHG | RESPIRATION RATE: 16 BRPM | TEMPERATURE: 98 F | HEART RATE: 90 BPM | WEIGHT: 170 LBS | OXYGEN SATURATION: 100 % | HEIGHT: 65 IN | BODY MASS INDEX: 28.32 KG/M2

## 2021-08-21 DIAGNOSIS — T67.5XXA HEAT EXHAUSTION, INITIAL ENCOUNTER: ICD-10-CM

## 2021-08-21 DIAGNOSIS — R55 NEAR SYNCOPE: Primary | ICD-10-CM

## 2021-08-21 LAB
ALBUMIN SERPL-MCNC: 4.3 G/DL (ref 3.2–4.6)
ALBUMIN/GLOB SERPL: 0.8 {RATIO} (ref 1.2–3.5)
ALP SERPL-CCNC: 82 U/L (ref 50–136)
ALT SERPL-CCNC: 21 U/L (ref 12–65)
ANION GAP SERPL CALC-SCNC: 10 MMOL/L (ref 7–16)
AST SERPL-CCNC: 24 U/L (ref 15–37)
BASOPHILS # BLD: 0.1 K/UL (ref 0–0.2)
BASOPHILS NFR BLD: 1 % (ref 0–2)
BILIRUB SERPL-MCNC: 0.8 MG/DL (ref 0.2–1.1)
BUN SERPL-MCNC: 19 MG/DL (ref 8–23)
CALCIUM SERPL-MCNC: 10.1 MG/DL (ref 8.3–10.4)
CHLORIDE SERPL-SCNC: 105 MMOL/L (ref 98–107)
CK SERPL-CCNC: 263 U/L (ref 21–215)
CO2 SERPL-SCNC: 23 MMOL/L (ref 21–32)
CREAT SERPL-MCNC: 1.66 MG/DL (ref 0.8–1.5)
DIFFERENTIAL METHOD BLD: ABNORMAL
EOSINOPHIL # BLD: 0.1 K/UL (ref 0–0.8)
EOSINOPHIL NFR BLD: 1 % (ref 0.5–7.8)
ERYTHROCYTE [DISTWIDTH] IN BLOOD BY AUTOMATED COUNT: 23.9 % (ref 11.9–14.6)
ETHANOL SERPL-MCNC: <3 MG/DL
GLOBULIN SER CALC-MCNC: 5.1 G/DL (ref 2.3–3.5)
GLUCOSE SERPL-MCNC: 100 MG/DL (ref 65–100)
HCT VFR BLD AUTO: 27.7 % (ref 41.1–50.3)
HGB BLD-MCNC: 7.9 G/DL (ref 13.6–17.2)
IMM GRANULOCYTES # BLD AUTO: 0 K/UL (ref 0–0.5)
IMM GRANULOCYTES NFR BLD AUTO: 0 % (ref 0–5)
LIPASE SERPL-CCNC: 262 U/L (ref 73–393)
LYMPHOCYTES # BLD: 1.2 K/UL (ref 0.5–4.6)
LYMPHOCYTES NFR BLD: 12 % (ref 13–44)
MAGNESIUM SERPL-MCNC: 2.2 MG/DL (ref 1.8–2.4)
MCH RBC QN AUTO: 18 PG (ref 26.1–32.9)
MCHC RBC AUTO-ENTMCNC: 28.5 G/DL (ref 31.4–35)
MCV RBC AUTO: 63.2 FL (ref 79.6–97.8)
MONOCYTES # BLD: 1.1 K/UL (ref 0.1–1.3)
MONOCYTES NFR BLD: 11 % (ref 4–12)
NEUTS SEG # BLD: 7.9 K/UL (ref 1.7–8.2)
NEUTS SEG NFR BLD: 75 % (ref 43–78)
NRBC # BLD: 0.02 K/UL (ref 0–0.2)
PLATELET # BLD AUTO: 165 K/UL (ref 150–450)
PMV BLD AUTO: ABNORMAL FL (ref 9.4–12.3)
POTASSIUM SERPL-SCNC: 4.5 MMOL/L (ref 3.5–5.1)
PROT SERPL-MCNC: 9.4 G/DL (ref 6.3–8.2)
RBC # BLD AUTO: 4.38 M/UL (ref 4.23–5.6)
SODIUM SERPL-SCNC: 138 MMOL/L (ref 138–145)
WBC # BLD AUTO: 10.4 K/UL (ref 4.3–11.1)

## 2021-08-21 PROCEDURE — 82077 ASSAY SPEC XCP UR&BREATH IA: CPT

## 2021-08-21 PROCEDURE — 83690 ASSAY OF LIPASE: CPT

## 2021-08-21 PROCEDURE — 99284 EMERGENCY DEPT VISIT MOD MDM: CPT

## 2021-08-21 PROCEDURE — 96360 HYDRATION IV INFUSION INIT: CPT

## 2021-08-21 PROCEDURE — 85025 COMPLETE CBC W/AUTO DIFF WBC: CPT

## 2021-08-21 PROCEDURE — 83735 ASSAY OF MAGNESIUM: CPT

## 2021-08-21 PROCEDURE — 93005 ELECTROCARDIOGRAM TRACING: CPT | Performed by: EMERGENCY MEDICINE

## 2021-08-21 PROCEDURE — 74011250636 HC RX REV CODE- 250/636: Performed by: EMERGENCY MEDICINE

## 2021-08-21 PROCEDURE — 80053 COMPREHEN METABOLIC PANEL: CPT

## 2021-08-21 PROCEDURE — 82550 ASSAY OF CK (CPK): CPT

## 2021-08-21 PROCEDURE — 81003 URINALYSIS AUTO W/O SCOPE: CPT

## 2021-08-21 RX ORDER — SODIUM CHLORIDE 0.9 % (FLUSH) 0.9 %
5-40 SYRINGE (ML) INJECTION AS NEEDED
Status: DISCONTINUED | OUTPATIENT
Start: 2021-08-21 | End: 2021-08-21 | Stop reason: HOSPADM

## 2021-08-21 RX ORDER — SODIUM CHLORIDE 0.9 % (FLUSH) 0.9 %
5-40 SYRINGE (ML) INJECTION EVERY 8 HOURS
Status: DISCONTINUED | OUTPATIENT
Start: 2021-08-21 | End: 2021-08-21 | Stop reason: HOSPADM

## 2021-08-21 RX ADMIN — SODIUM CHLORIDE 1000 ML: 900 INJECTION, SOLUTION INTRAVENOUS at 18:24

## 2021-08-21 NOTE — ED NOTES
I have reviewed discharge instructions with the patient. The patient verbalized understanding. Patient left ED via Discharge Method: ambulatory to Home . Opportunity for questions and clarification provided. Patient given 0 scripts. To continue your aftercare when you leave the hospital, you may receive an automated call from our care team to check in on how you are doing. This is a free service and part of our promise to provide the best care and service to meet your aftercare needs.  If you have questions, or wish to unsubscribe from this service please call [phone]. Thank you for Choosing our [address] [place] [address] Emergency Department.

## 2021-08-21 NOTE — ED PROVIDER NOTES
27-year-old black female with history of situs presents the emergency department complaining of walking for approximately an hour in the 93 degree heat, started to feel extremely lightheaded and then had to lay down with chills. Bystanders applied ice packs before EMS arrival and the patient was feeling much better by the time EMS arrived. Patient denies any nausea vomiting or chest pain. He denies any associated shortness of breath. The history is provided by the patient and the EMS personnel. Other  This is a new problem. The current episode started 1 to 2 hours ago. The problem occurs rarely. The problem has been resolved. Pertinent negatives include no chest pain, no abdominal pain, no headaches and no shortness of breath. The symptoms are aggravated by walking. The symptoms are relieved by rest. He has tried rest, a cold compress and water for the symptoms. The treatment provided significant relief. Past Medical History:   Diagnosis Date    Acute pancreatitis 6/10/2021       No past surgical history on file. No family history on file. Social History     Socioeconomic History    Marital status: SINGLE     Spouse name: Not on file    Number of children: Not on file    Years of education: Not on file    Highest education level: Not on file   Occupational History    Not on file   Tobacco Use    Smoking status: Current Every Day Smoker   Substance and Sexual Activity    Alcohol use: Yes    Drug use: Yes     Types: Cocaine    Sexual activity: Not on file   Other Topics Concern    Not on file   Social History Narrative    Not on file     Social Determinants of Health     Financial Resource Strain:     Difficulty of Paying Living Expenses:    Food Insecurity:     Worried About Running Out of Food in the Last Year:     [address] in the Last Year:    Transportation Needs:     Lack of Transportation (Medical):      Lack of Transportation (Non-Medical):    Physical Activity:  Days of Exercise per Week:     Minutes of Exercise per Session:    Stress:     Feeling of Stress :    Social Connections:     Frequency of Communication with Friends and Family:     Frequency of Social Gatherings with Friends and Family:     Attends Sikhism Services:     Active Member of Clubs or Organizations:     Attends Club or Organization Meetings:     Marital Status:    Intimate Partner Violence:     Fear of Current or Ex-Partner:     Emotionally Abused:     Physically Abused:     Sexually Abused: ALLERGIES: Patient has no known allergies. Review of Systems   Constitutional: Positive for chills and fatigue. Negative for fever. Respiratory: Negative for shortness of breath. Cardiovascular: Negative for chest pain, palpitations and leg swelling. Gastrointestinal: Negative for abdominal pain, nausea and vomiting. Genitourinary: Negative for dysuria. Neurological: Positive for light-headedness. Negative for tremors, seizures, syncope, facial asymmetry, weakness and headaches. All other systems reviewed and are negative. Vitals:    08/21/21 1623   BP: (!) 157/71   Pulse: 93   Resp: 16   Temp: 98.7 °F (37.1 °C)   SpO2: 100%   Weight: 77.1 kg (170 lb)   Height: 5' 5\" (1.651 m)            Physical Exam  Vitals and nursing note reviewed. Constitutional:       General: He is not in acute distress. Appearance: He is well-developed. HENT:      Head: Normocephalic and atraumatic. Right Ear: External ear normal.      Left Ear: External ear normal.      Mouth/Throat:      Mouth: Mucous membranes are moist.   Eyes:      Extraocular Movements: Extraocular movements intact. Conjunctiva/sclera: Conjunctivae normal.      Pupils: Pupils are equal, round, and reactive to light. Cardiovascular:      Rate and Rhythm: Normal rate and regular rhythm. Pulses: Normal pulses. Heart sounds: Normal heart sounds. No murmur heard.      Pulmonary:      Effort: Pulmonary effort is normal.      Breath sounds: Normal breath sounds. Abdominal:      General: Abdomen is flat. Bowel sounds are normal.      Palpations: Abdomen is soft. There is no shifting dullness, hepatomegaly, splenomegaly, mass or pulsatile mass. Tenderness: There is abdominal tenderness (Mild) in the epigastric area. There is no right CVA tenderness, left CVA tenderness, guarding or rebound. Negative signs include Tim's sign and McBurney's sign. Hernia: No hernia is present. Musculoskeletal:         General: Normal range of motion. Cervical back: Normal range of motion and neck supple. Right lower leg: No edema. Left lower leg: No edema. Skin:     General: Skin is warm and dry. Capillary Refill: Capillary refill takes less than 2 seconds. Neurological:      General: No focal deficit present. Mental Status: He is alert and oriented to person, place, and time. Psychiatric:         Mood and Affect: Mood normal.         Behavior: Behavior normal.          MDM  Number of Diagnoses or Management Options  Heat exhaustion, initial encounter: new and requires workup  Near syncope: new and requires workup     Amount and/or Complexity of Data Reviewed  Clinical lab tests: ordered and reviewed  Tests in the medicine section of CPT®: ordered and reviewed (ECG interpretation for ECG dated 21 August 2021 at 6:02 PM: ECG reveals a normal sinus rhythm at a rate of 82 bpm, normal OR and QTc intervals, no evidence of ectopy or ischemia. Borderline ECG. Corrine Chavez MD  )  Review and summarize past medical records: yes    Risk of Complications, Morbidity, and/or Mortality  Presenting problems: moderate  Diagnostic procedures: minimal  Management options: moderate    Patient Progress  Patient progress: improved         Procedures    The patient was observed in the ED. patient hydrated with normal saline, feeling much better at time of discharge.     Results Reviewed:      Recent Results (from the past 24 hour(s))   CBC WITH AUTOMATED DIFF    Collection Time: 08/21/21  4:30 PM   Result Value Ref Range    WBC 10.4 4.3 - 11.1 K/uL    RBC 4.38 4.23 - 5.6 M/uL    HGB 7.9 (L) 13.6 - 17.2 g/dL    HCT 27.7 (L) 41.1 - 50.3 %    MCV 63.2 (L) 79.6 - 97.8 FL    MCH 18.0 (L) 26.1 - 32.9 PG    MCHC 28.5 (L) 31.4 - 35.0 g/dL    RDW 23.9 (H) 11.9 - 14.6 %    PLATELET 503 311 - 576 K/uL    MPV Unable to calculate. Recommend adding IPF. 9.4 - 12.3 FL    ABSOLUTE NRBC 0.02 0.0 - 0.2 K/uL    NEUTROPHILS 75 43 - 78 %    LYMPHOCYTES 12 (L) 13 - 44 %    MONOCYTES 11 4.0 - 12.0 %    EOSINOPHILS 1 0.5 - 7.8 %    BASOPHILS 1 0.0 - 2.0 %    IMMATURE GRANULOCYTES 0 0.0 - 5.0 %    ABS. NEUTROPHILS 7.9 1.7 - 8.2 K/UL    ABS. LYMPHOCYTES 1.2 0.5 - 4.6 K/UL    ABS. MONOCYTES 1.1 0.1 - 1.3 K/UL    ABS. EOSINOPHILS 0.1 0.0 - 0.8 K/UL    ABS. BASOPHILS 0.1 0.0 - 0.2 K/UL    ABS. IMM. GRANS. 0.0 0.0 - 0.5 K/UL    DF AUTOMATED     METABOLIC PANEL, COMPREHENSIVE    Collection Time: 08/21/21  4:30 PM   Result Value Ref Range    Sodium 138 138 - 145 mmol/L    Potassium 4.5 3.5 - 5.1 mmol/L    Chloride 105 98 - 107 mmol/L    CO2 23 21 - 32 mmol/L    Anion gap 10 7 - 16 mmol/L    Glucose 100 65 - 100 mg/dL    BUN 19 8 - 23 MG/DL    Creatinine 1.66 (H) 0.8 - 1.5 MG/DL    GFR est AA 54 (L) >60 ml/min/1.73m2    GFR est non-AA 45 (L) >60 ml/min/1.73m2    Calcium 10.1 8.3 - 10.4 MG/DL    Bilirubin, total 0.8 0.2 - 1.1 MG/DL    ALT (SGPT) 21 12 - 65 U/L    AST (SGOT) 24 15 - 37 U/L    Alk.  phosphatase 82 50 - 136 U/L    Protein, total 9.4 (H) 6.3 - 8.2 g/dL    Albumin 4.3 3.2 - 4.6 g/dL    Globulin 5.1 (H) 2.3 - 3.5 g/dL    A-G Ratio 0.8 (L) 1.2 - 3.5     MAGNESIUM    Collection Time: 08/21/21  4:30 PM   Result Value Ref Range    Magnesium 2.2 1.8 - 2.4 mg/dL   CK    Collection Time: 08/21/21  4:30 PM   Result Value Ref Range     (H) 21 - 215 U/L   LIPASE    Collection Time: 08/21/21  4:30 PM   Result Value Ref Range    Lipase 262 73 - 393 U/L   ETHYL ALCOHOL    Collection Time: 08/21/21  4:30 PM   Result Value Ref Range    ALCOHOL(ETHYL),SERUM <3 MG/DL       I discussed the results of all labs, procedures, radiographs, and treatments with the patient and available family. Treatment plan is agreed upon and the patient is ready for discharge. All voiced understanding of the discharge plan and medication instructions or changes as appropriate. Questions about treatment in the ED were answered. All were encouraged to return should symptoms worsen or new problems develop.

## 2021-08-21 NOTE — ED TRIAGE NOTES
Pt presents in wheelchair to Triage with complaints of sweating, \"trembling\" while sitting in the bed of a truck. Pt reports walking to brother's house just prior to this. Family placed ice on patient, states this calmed him down. Pt denies headache, lightheadedness/dizziness.  Pt denies chest pain, SOB/cough, N/V.

## 2021-08-21 NOTE — ED TRIAGE NOTES
Pt presents from street via EMS. Friends called and stated patient had to walk 2 blocks and passed out/had syncopal episode/seizure. On EMS arrival patient had bags of ice on him as well as a cooling fan. 98.2  EKG unremarkable /78. 20G LFA.

## 2021-08-23 LAB
ATRIAL RATE: 82 BPM
CALCULATED P AXIS, ECG09: 55 DEGREES
CALCULATED R AXIS, ECG10: 58 DEGREES
CALCULATED T AXIS, ECG11: -2 DEGREES
DIAGNOSIS, 93000: NORMAL
P-R INTERVAL, ECG05: 138 MS
Q-T INTERVAL, ECG07: 380 MS
QRS DURATION, ECG06: 78 MS
QTC CALCULATION (BEZET), ECG08: 443 MS
VENTRICULAR RATE, ECG03: 82 BPM

## 2022-01-29 ENCOUNTER — HOSPITAL ENCOUNTER (EMERGENCY)
Age: 62
Discharge: HOME | End: 2022-01-29
Attending: EMERGENCY MEDICINE

## 2022-01-29 ENCOUNTER — APPOINTMENT (OUTPATIENT)
Dept: GENERAL RADIOLOGY | Age: 62
End: 2022-01-29
Attending: EMERGENCY MEDICINE

## 2022-01-29 VITALS
HEART RATE: 73 BPM | OXYGEN SATURATION: 99 % | RESPIRATION RATE: 25 BRPM | WEIGHT: 170 LBS | DIASTOLIC BLOOD PRESSURE: 62 MMHG | SYSTOLIC BLOOD PRESSURE: 109 MMHG | BODY MASS INDEX: 28.32 KG/M2 | TEMPERATURE: 98.7 F | HEIGHT: 65 IN

## 2022-01-29 DIAGNOSIS — I95.1 ORTHOSTASIS: Primary | ICD-10-CM

## 2022-01-29 DIAGNOSIS — E86.0 DEHYDRATION: ICD-10-CM

## 2022-01-29 DIAGNOSIS — Z20.822 SUSPECTED COVID-19 VIRUS INFECTION: ICD-10-CM

## 2022-01-29 LAB
ALBUMIN SERPL-MCNC: 3.2 G/DL (ref 3.2–4.6)
ALBUMIN/GLOB SERPL: 0.6 {RATIO} (ref 1.2–3.5)
ALP SERPL-CCNC: 75 U/L (ref 50–136)
ALT SERPL-CCNC: 14 U/L (ref 12–65)
ANION GAP SERPL CALC-SCNC: 7 MMOL/L (ref 7–16)
AST SERPL-CCNC: 14 U/L (ref 15–37)
BASOPHILS # BLD: 0.1 K/UL (ref 0–0.2)
BASOPHILS NFR BLD: 1 % (ref 0–2)
BILIRUB SERPL-MCNC: 0.4 MG/DL (ref 0.2–1.1)
BILIRUB UR QL: NEGATIVE
BUN SERPL-MCNC: 14 MG/DL (ref 8–23)
CALCIUM SERPL-MCNC: 9.6 MG/DL (ref 8.3–10.4)
CHLORIDE SERPL-SCNC: 105 MMOL/L (ref 98–107)
CO2 SERPL-SCNC: 23 MMOL/L (ref 21–32)
COVID-19 RAPID TEST, COVR: NOT DETECTED
CREAT SERPL-MCNC: 1.69 MG/DL (ref 0.8–1.5)
DIFFERENTIAL METHOD BLD: ABNORMAL
EOSINOPHIL # BLD: 0 K/UL (ref 0–0.8)
EOSINOPHIL NFR BLD: 0 % (ref 0.5–7.8)
ERYTHROCYTE [DISTWIDTH] IN BLOOD BY AUTOMATED COUNT: 23.2 % (ref 11.9–14.6)
GLOBULIN SER CALC-MCNC: 5.5 G/DL (ref 2.3–3.5)
GLUCOSE SERPL-MCNC: 145 MG/DL (ref 65–100)
GLUCOSE UR QL STRIP.AUTO: NEGATIVE MG/DL
HCT VFR BLD AUTO: 26.5 % (ref 41.1–50.3)
HGB BLD-MCNC: 7.7 G/DL (ref 13.6–17.2)
IMM GRANULOCYTES # BLD AUTO: 0 K/UL (ref 0–0.5)
IMM GRANULOCYTES NFR BLD AUTO: 0 % (ref 0–5)
KETONES UR-MCNC: NEGATIVE MG/DL
LACTATE SERPL-SCNC: 1.8 MMOL/L (ref 0.4–2)
LEUKOCYTE ESTERASE UR QL STRIP: NEGATIVE
LYMPHOCYTES # BLD: 1.2 K/UL (ref 0.5–4.6)
LYMPHOCYTES NFR BLD: 14 % (ref 13–44)
MCH RBC QN AUTO: 17.5 PG (ref 26.1–32.9)
MCHC RBC AUTO-ENTMCNC: 29.1 G/DL (ref 31.4–35)
MCV RBC AUTO: 60.4 FL (ref 79.6–97.8)
MONOCYTES # BLD: 0.9 K/UL (ref 0.1–1.3)
MONOCYTES NFR BLD: 11 % (ref 4–12)
NEUTS SEG # BLD: 6.2 K/UL (ref 1.7–8.2)
NEUTS SEG NFR BLD: 74 % (ref 43–78)
NITRITE UR QL: NEGATIVE
NRBC # BLD: 0 K/UL (ref 0–0.2)
PH UR: 5.5 [PH] (ref 5–9)
PLATELET # BLD AUTO: 380 K/UL (ref 150–450)
PMV BLD AUTO: ABNORMAL FL (ref 9.4–12.3)
POTASSIUM SERPL-SCNC: 4.1 MMOL/L (ref 3.5–5.1)
PROCALCITONIN SERPL-MCNC: 0.27 NG/ML (ref 0–0.49)
PROT SERPL-MCNC: 8.7 G/DL (ref 6.3–8.2)
PROT UR QL: NEGATIVE MG/DL
RBC # BLD AUTO: 4.39 M/UL (ref 4.23–5.6)
RBC # UR STRIP: NEGATIVE /UL
SARS-COV-2, COV2: NORMAL
SERVICE CMNT-IMP: NORMAL
SODIUM SERPL-SCNC: 135 MMOL/L (ref 138–145)
SOURCE, COVRS: NORMAL
SP GR UR: 1.01 (ref 1–1.02)
UROBILINOGEN UR QL: 0.2 EU/DL (ref 0.2–1)
WBC # BLD AUTO: 8.5 K/UL (ref 4.3–11.1)

## 2022-01-29 PROCEDURE — 93005 ELECTROCARDIOGRAM TRACING: CPT | Performed by: EMERGENCY MEDICINE

## 2022-01-29 PROCEDURE — 85025 COMPLETE CBC W/AUTO DIFF WBC: CPT

## 2022-01-29 PROCEDURE — 94762 N-INVAS EAR/PLS OXIMTRY CONT: CPT

## 2022-01-29 PROCEDURE — 96361 HYDRATE IV INFUSION ADD-ON: CPT

## 2022-01-29 PROCEDURE — 74011250636 HC RX REV CODE- 250/636: Performed by: EMERGENCY MEDICINE

## 2022-01-29 PROCEDURE — U0005 INFEC AGEN DETEC AMPLI PROBE: HCPCS

## 2022-01-29 PROCEDURE — 87635 SARS-COV-2 COVID-19 AMP PRB: CPT

## 2022-01-29 PROCEDURE — 99285 EMERGENCY DEPT VISIT HI MDM: CPT

## 2022-01-29 PROCEDURE — 83605 ASSAY OF LACTIC ACID: CPT

## 2022-01-29 PROCEDURE — 74011250637 HC RX REV CODE- 250/637: Performed by: EMERGENCY MEDICINE

## 2022-01-29 PROCEDURE — 80053 COMPREHEN METABOLIC PANEL: CPT

## 2022-01-29 PROCEDURE — 96360 HYDRATION IV INFUSION INIT: CPT

## 2022-01-29 PROCEDURE — 71046 X-RAY EXAM CHEST 2 VIEWS: CPT

## 2022-01-29 PROCEDURE — 81003 URINALYSIS AUTO W/O SCOPE: CPT

## 2022-01-29 PROCEDURE — 84145 PROCALCITONIN (PCT): CPT

## 2022-01-29 RX ORDER — SODIUM CHLORIDE 0.9 % (FLUSH) 0.9 %
5-10 SYRINGE (ML) INJECTION AS NEEDED
Status: DISCONTINUED | OUTPATIENT
Start: 2022-01-29 | End: 2022-01-30 | Stop reason: HOSPADM

## 2022-01-29 RX ORDER — ACETAMINOPHEN 500 MG
1000 TABLET ORAL
Status: DISCONTINUED | OUTPATIENT
Start: 2022-01-29 | End: 2022-01-29

## 2022-01-29 RX ORDER — ACETAMINOPHEN 325 MG/1
650 TABLET ORAL
Status: COMPLETED | OUTPATIENT
Start: 2022-01-29 | End: 2022-01-29

## 2022-01-29 RX ORDER — SODIUM CHLORIDE 0.9 % (FLUSH) 0.9 %
5-10 SYRINGE (ML) INJECTION EVERY 8 HOURS
Status: DISCONTINUED | OUTPATIENT
Start: 2022-01-29 | End: 2022-01-30 | Stop reason: HOSPADM

## 2022-01-29 RX ORDER — DOXYCYCLINE HYCLATE 100 MG
100 TABLET ORAL 2 TIMES DAILY
Qty: 14 TABLET | Refills: 0 | Status: SHIPPED | OUTPATIENT
Start: 2022-01-29 | End: 2022-02-05

## 2022-01-29 RX ADMIN — SODIUM CHLORIDE 1000 ML: 900 INJECTION, SOLUTION INTRAVENOUS at 20:19

## 2022-01-29 RX ADMIN — ACETAMINOPHEN 650 MG: 325 TABLET ORAL at 18:41

## 2022-01-29 RX ADMIN — SODIUM CHLORIDE 1000 ML: 900 INJECTION, SOLUTION INTRAVENOUS at 22:11

## 2022-01-29 NOTE — ED PROVIDER NOTES
80-year-old male presents with lightheadedness. This is occurred last few days. When he stands or walks he gets lightheaded and feels like he is going to pass out and so weeks he drops to the floor. No complete syncope. Feels like he has had some fever and chills. Thought he had Covid and try to test at home that he said was negative. Resting in bed a great deal the last week or 2. Review of records reveals a history of anemia. Patient states did not take any medications. History of alcohol use in the past.  History of pancreatitis. He does complain some dyspnea on exertion to me when he stands up but no specific chest or abdominal pain. No vomiting diarrhea or melena. The history is provided by the patient. Dizziness  This is a new problem. The current episode started more than 2 days ago. The problem has been gradually worsening. There was no focality noted. Patient reports a subjective fever - was not measured. Associated symptoms include shortness of breath. Pertinent negatives include no chest pain, no vomiting and no nausea. Associated medical issues do not include seizures. Past Medical History:   Diagnosis Date    Acute pancreatitis 6/10/2021       No past surgical history on file. No family history on file. Social History     Socioeconomic History    Marital status: SINGLE     Spouse name: Not on file    Number of children: Not on file    Years of education: Not on file    Highest education level: Not on file   Occupational History    Not on file   Tobacco Use    Smoking status: Current Every Day Smoker    Smokeless tobacco: Not on file   Substance and Sexual Activity    Alcohol use:  Yes    Drug use: Yes     Types: Cocaine    Sexual activity: Not on file   Other Topics Concern    Not on file   Social History Narrative    Not on file     Social Determinants of Health     Financial Resource Strain:     Difficulty of Paying Living Expenses: Not on file   Food Insecurity:     Worried About Running Out of Food in the Last Year: Not on file    [place] of Food in the Last Year: Not on file   Transportation Needs:     Lack of Transportation (Medical): Not on file    Lack of Transportation (Non-Medical): Not on file   Physical Activity:     Days of Exercise per Week: Not on file    Minutes of Exercise per Session: Not on file   Stress:     Feeling of Stress : Not on file   Social Connections:     Frequency of Communication with Friends and Family: Not on file    Frequency of Social Gatherings with Friends and Family: Not on file    Attends Mandaen Services: Not on file    Active Member of [address] [place] [address] [employer] or Organizations: Not on file    Attends Club or Organization Meetings: Not on file    Marital Status: Not on file   Intimate Partner Violence:     Fear of Current or Ex-Partner: Not on file    Emotionally Abused: Not on file    Physically Abused: Not on file    Sexually Abused: Not on file   Housing Stability:     Unable to Pay for Housing in the Last Year: Not on file    Number of [place] in the Last Year: Not on file    Unstable Housing in the Last Year: Not on file         ALLERGIES: Patient has no known allergies. Review of Systems   Constitutional: Positive for chills, fatigue and fever. HENT: Positive for congestion. Respiratory: Positive for shortness of breath. Negative for cough. Cardiovascular: Negative for chest pain. Gastrointestinal: Negative for abdominal pain, nausea and vomiting. Musculoskeletal: Negative for arthralgias. Neurological: Positive for dizziness. All other systems reviewed and are negative. Vitals:    01/29/22 1700   BP: 118/64   Pulse: (!) 142   Resp: 18   Temp: 98.7 °F (37.1 °C)   SpO2: 100%   Weight: 77.1 kg (170 lb)   Height: 5' 5\" (1.651 m)            Physical Exam  Vitals and nursing note reviewed. Constitutional:       General: He is not in acute distress. Appearance: He is well-developed. HENT:      Head: Normocephalic and atraumatic. Right Ear: External ear normal.      Left Ear: External ear normal.      Mouth/Throat:      Pharynx: No oropharyngeal exudate. Eyes:      Conjunctiva/sclera: Conjunctivae normal.      Pupils: Pupils are equal, round, and reactive to light. Cardiovascular:      Rate and Rhythm: Normal rate and regular rhythm. Heart sounds: No murmur heard. Pulmonary:      Effort: No respiratory distress. Breath sounds: Normal breath sounds. Abdominal:      General: Bowel sounds are normal.      Palpations: Abdomen is soft. There is no mass. Tenderness: There is no abdominal tenderness. There is no guarding or rebound. Hernia: No hernia is present. Musculoskeletal:         General: No swelling or tenderness. Cervical back: Normal range of motion and neck supple. Right lower leg: No edema. Left lower leg: No edema. Skin:     General: Skin is warm and dry. Neurological:      Mental Status: He is alert and oriented to person, place, and time. Comments: Nl speech   Psychiatric:         Speech: Speech normal.          MDM  Number of Diagnoses or Management Options  Diagnosis management comments: I rechecked patient's temperature. 101.5. Screen for Covid, UTI, sepsis, pneumonia. Tachycardic. Probably due to fever but check EKG.        Amount and/or Complexity of Data Reviewed  Clinical lab tests: ordered and reviewed  Tests in the radiology section of CPT®: ordered and reviewed  Tests in the medicine section of CPT®: ordered and reviewed  Independent visualization of images, tracings, or specimens: yes    Risk of Complications, Morbidity, and/or Mortality  Presenting problems: moderate  Diagnostic procedures: minimal  Management options: low    Patient Progress  Patient progress: stable         Procedures

## 2022-01-29 NOTE — ED TRIAGE NOTES
Patient states lightheaded, dizzy when he stands. Symptoms for 3-4 weeks. Hasn't been feeling well and just started back eating normally last few days. Notes vomiting, last episode was Thursday.  Denies fall or injuries

## 2022-01-30 LAB
SARS-COV-2, COV2: NOT DETECTED
SPECIMEN SOURCE, FCOV2M: NORMAL

## 2022-01-30 NOTE — ED NOTES
I have reviewed discharge instructions with the patient. The patient verbalized understanding. Patient left ED via Discharge Method: ambulatory to Home. Opportunity for questions and clarification provided. Patient given 1 scripts. To continue your aftercare when you leave the hospital, you may receive an automated call from our care team to check in on how you are doing. This is a free service and part of our promise to provide the best care and service to meet your aftercare needs.  If you have questions, or wish to unsubscribe from this service please call [phone]. Thank you for Choosing our [facility] Emergency Department.

## 2022-01-30 NOTE — ED NOTES
Patient was noted to be orthostatic. Given 2 L of fluids with improvement in symptoms. Urinalysis negative for infection. Will cover with doxycycline for presumptive lower respiratory infection. We will send off a Covid PCR.

## 2022-01-30 NOTE — DISCHARGE INSTRUCTIONS
Take medication as prescribed  Drink plenty of fluids  Follow-up with your primary care physician  Return to the ER for any new, worsening or life-threatening symptoms

## 2022-03-18 PROBLEM — N18.2 STAGE 2 CHRONIC KIDNEY DISEASE: Status: ACTIVE | Noted: 2021-06-09

## 2022-03-18 PROBLEM — R73.03 PREDIABETES: Status: ACTIVE | Noted: 2021-06-09

## 2022-03-19 PROBLEM — F10.20 ALCOHOL DEPENDENCY (HCC): Status: ACTIVE | Noted: 2020-07-17

## 2022-03-19 PROBLEM — R10.9 ABDOMINAL PAIN: Status: ACTIVE | Noted: 2020-07-16

## 2022-03-19 PROBLEM — D64.9 SYMPTOMATIC ANEMIA: Status: ACTIVE | Noted: 2021-06-09

## 2022-03-19 PROBLEM — K85.90 ACUTE PANCREATITIS: Status: ACTIVE | Noted: 2021-06-10

## 2022-03-19 PROBLEM — K31.819 GASTRIC AND DUODENAL ANGIODYSPLASIA: Status: ACTIVE | Noted: 2021-06-10

## 2022-03-19 PROBLEM — F10.929 ALCOHOL INTOXICATION (HCC): Status: ACTIVE | Noted: 2020-07-17

## 2022-03-19 PROBLEM — D64.9 SEVERE ANEMIA: Status: ACTIVE | Noted: 2020-07-16

## 2022-03-19 PROBLEM — N17.9 AKI (ACUTE KIDNEY INJURY) (HCC): Status: ACTIVE | Noted: 2021-06-09

## 2023-08-21 ENCOUNTER — HOSPITAL ENCOUNTER (INPATIENT)
Age: 63
LOS: 3 days | Discharge: HOME | DRG: 392 | End: 2023-08-24
Attending: EMERGENCY MEDICINE | Admitting: INTERNAL MEDICINE

## 2023-08-21 ENCOUNTER — APPOINTMENT (OUTPATIENT)
Dept: CT IMAGING | Age: 63
DRG: 392 | End: 2023-08-21

## 2023-08-21 DIAGNOSIS — K31.819 GASTRIC AND DUODENAL ANGIODYSPLASIA: ICD-10-CM

## 2023-08-21 DIAGNOSIS — R39.11 URINARY HESITANCY: ICD-10-CM

## 2023-08-21 DIAGNOSIS — D64.9 ANEMIA, UNSPECIFIED TYPE: Primary | ICD-10-CM

## 2023-08-21 DIAGNOSIS — R33.9 URINARY RETENTION: ICD-10-CM

## 2023-08-21 PROBLEM — F10.20 ALCOHOL DEPENDENCY (HCC): Status: ACTIVE | Noted: 2020-07-17

## 2023-08-21 PROBLEM — D62 ABLA (ACUTE BLOOD LOSS ANEMIA): Status: ACTIVE | Noted: 2023-08-21

## 2023-08-21 PROBLEM — F10.10 ALCOHOL ABUSE: Status: ACTIVE | Noted: 2023-08-21

## 2023-08-21 LAB
ALBUMIN SERPL-MCNC: 4.3 G/DL (ref 3.2–4.6)
ALBUMIN/GLOB SERPL: 0.9 (ref 0.4–1.6)
ALP SERPL-CCNC: 62 U/L (ref 50–136)
ALT SERPL-CCNC: 20 U/L (ref 12–65)
ANION GAP SERPL CALC-SCNC: 9 MMOL/L (ref 2–11)
AST SERPL-CCNC: 39 U/L (ref 15–37)
BACTERIA URNS QL MICRO: NEGATIVE /HPF
BASOPHILS # BLD: 0.1 K/UL (ref 0–0.2)
BASOPHILS NFR BLD: 1 % (ref 0–2)
BILIRUB SERPL-MCNC: 0.5 MG/DL (ref 0.2–1.1)
BUN SERPL-MCNC: 16 MG/DL (ref 8–23)
CALCIUM SERPL-MCNC: 10.3 MG/DL (ref 8.3–10.4)
CASTS URNS QL MICRO: ABNORMAL /LPF
CHLORIDE SERPL-SCNC: 110 MMOL/L (ref 101–110)
CO2 SERPL-SCNC: 22 MMOL/L (ref 21–32)
CREAT SERPL-MCNC: 1.3 MG/DL (ref 0.8–1.5)
DIFFERENTIAL METHOD BLD: ABNORMAL
EOSINOPHIL # BLD: 0.3 K/UL (ref 0–0.8)
EOSINOPHIL NFR BLD: 4 % (ref 0.5–7.8)
EPI CELLS #/AREA URNS HPF: ABNORMAL /HPF
ERYTHROCYTE [DISTWIDTH] IN BLOOD BY AUTOMATED COUNT: 22.3 % (ref 11.9–14.6)
FERRITIN SERPL-MCNC: 8 NG/ML (ref 8–388)
FOLATE SERPL-MCNC: 12.6 NG/ML (ref 3.1–17.5)
GLOBULIN SER CALC-MCNC: 4.9 G/DL (ref 2.8–4.5)
GLUCOSE SERPL-MCNC: 85 MG/DL (ref 65–100)
HCT VFR BLD AUTO: 22.8 % (ref 41.1–50.3)
HGB BLD-MCNC: 6.2 G/DL (ref 13.6–17.2)
HGB RETIC QN AUTO: 18 PG (ref 29–35)
HISTORY CHECK: NORMAL
IMM GRANULOCYTES # BLD AUTO: 0 K/UL (ref 0–0.5)
IMM GRANULOCYTES NFR BLD AUTO: 1 % (ref 0–5)
IMM RETICS NFR: 33.1 % (ref 2.3–13.4)
IRON SATN MFR SERPL: 2 %
IRON SERPL-MCNC: 11 UG/DL (ref 35–150)
LDH SERPL L TO P-CCNC: 207 U/L (ref 110–210)
LYMPHOCYTES # BLD: 1.6 K/UL (ref 0.5–4.6)
LYMPHOCYTES NFR BLD: 24 % (ref 13–44)
MCH RBC QN AUTO: 16.4 PG (ref 26.1–32.9)
MCHC RBC AUTO-ENTMCNC: 27.2 G/DL (ref 31.4–35)
MCV RBC AUTO: 60.2 FL (ref 82–102)
MONOCYTES # BLD: 0.6 K/UL (ref 0.1–1.3)
MONOCYTES NFR BLD: 9 % (ref 4–12)
NEUTS SEG # BLD: 4.3 K/UL (ref 1.7–8.2)
NEUTS SEG NFR BLD: 61 % (ref 43–78)
NRBC # BLD: 0 K/UL (ref 0–0.2)
PLATELET # BLD AUTO: 210 K/UL (ref 150–450)
PMV BLD AUTO: 9.6 FL (ref 9.4–12.3)
POTASSIUM SERPL-SCNC: 4.7 MMOL/L (ref 3.5–5.1)
PROT SERPL-MCNC: 9.2 G/DL (ref 6.3–8.2)
RBC # BLD AUTO: 3.79 M/UL (ref 4.23–5.6)
RBC #/AREA URNS HPF: ABNORMAL /HPF
RETICS # AUTO: 0.06 M/UL (ref 0.03–0.1)
RETICS/RBC NFR AUTO: 1.7 % (ref 0.3–2)
SODIUM SERPL-SCNC: 141 MMOL/L (ref 133–143)
TIBC SERPL-MCNC: 480 UG/DL (ref 250–450)
VIT B12 SERPL-MCNC: 306 PG/ML (ref 193–986)
WBC # BLD AUTO: 7 K/UL (ref 4.3–11.1)
WBC URNS QL MICRO: ABNORMAL /HPF

## 2023-08-21 PROCEDURE — 83540 ASSAY OF IRON: CPT

## 2023-08-21 PROCEDURE — 51798 US URINE CAPACITY MEASURE: CPT

## 2023-08-21 PROCEDURE — 83010 ASSAY OF HAPTOGLOBIN QUANT: CPT

## 2023-08-21 PROCEDURE — 83550 IRON BINDING TEST: CPT

## 2023-08-21 PROCEDURE — 85025 COMPLETE CBC W/AUTO DIFF WBC: CPT

## 2023-08-21 PROCEDURE — 86901 BLOOD TYPING SEROLOGIC RH(D): CPT

## 2023-08-21 PROCEDURE — 82607 VITAMIN B-12: CPT

## 2023-08-21 PROCEDURE — 80053 COMPREHEN METABOLIC PANEL: CPT

## 2023-08-21 PROCEDURE — 36430 TRANSFUSION BLD/BLD COMPNT: CPT

## 2023-08-21 PROCEDURE — 74177 CT ABD & PELVIS W/CONTRAST: CPT

## 2023-08-21 PROCEDURE — 86850 RBC ANTIBODY SCREEN: CPT

## 2023-08-21 PROCEDURE — 1100000000 HC RM PRIVATE

## 2023-08-21 PROCEDURE — 6360000002 HC RX W HCPCS: Performed by: INTERNAL MEDICINE

## 2023-08-21 PROCEDURE — 2580000003 HC RX 258: Performed by: INTERNAL MEDICINE

## 2023-08-21 PROCEDURE — P9016 RBC LEUKOCYTES REDUCED: HCPCS

## 2023-08-21 PROCEDURE — 99285 EMERGENCY DEPT VISIT HI MDM: CPT

## 2023-08-21 PROCEDURE — 86923 COMPATIBILITY TEST ELECTRIC: CPT

## 2023-08-21 PROCEDURE — 86900 BLOOD TYPING SEROLOGIC ABO: CPT

## 2023-08-21 PROCEDURE — 36415 COLL VENOUS BLD VENIPUNCTURE: CPT

## 2023-08-21 PROCEDURE — C9113 INJ PANTOPRAZOLE SODIUM, VIA: HCPCS | Performed by: INTERNAL MEDICINE

## 2023-08-21 PROCEDURE — 85046 RETICYTE/HGB CONCENTRATE: CPT

## 2023-08-21 PROCEDURE — 82746 ASSAY OF FOLIC ACID SERUM: CPT

## 2023-08-21 PROCEDURE — 81015 MICROSCOPIC EXAM OF URINE: CPT

## 2023-08-21 PROCEDURE — 6360000004 HC RX CONTRAST MEDICATION

## 2023-08-21 PROCEDURE — A4216 STERILE WATER/SALINE, 10 ML: HCPCS | Performed by: INTERNAL MEDICINE

## 2023-08-21 PROCEDURE — 83615 LACTATE (LD) (LDH) ENZYME: CPT

## 2023-08-21 PROCEDURE — 82728 ASSAY OF FERRITIN: CPT

## 2023-08-21 RX ORDER — LORAZEPAM 1 MG/1
3 TABLET ORAL
Status: DISCONTINUED | OUTPATIENT
Start: 2023-08-21 | End: 2023-08-24 | Stop reason: HOSPADM

## 2023-08-21 RX ORDER — ACETAMINOPHEN 650 MG/1
650 SUPPOSITORY RECTAL EVERY 6 HOURS PRN
Status: DISCONTINUED | OUTPATIENT
Start: 2023-08-21 | End: 2023-08-21

## 2023-08-21 RX ORDER — POLYETHYLENE GLYCOL 3350 17 G/17G
17 POWDER, FOR SOLUTION ORAL DAILY PRN
Status: DISCONTINUED | OUTPATIENT
Start: 2023-08-21 | End: 2023-08-24 | Stop reason: HOSPADM

## 2023-08-21 RX ORDER — FOLIC ACID 1 MG/1
1 TABLET ORAL DAILY
Status: DISCONTINUED | OUTPATIENT
Start: 2023-08-22 | End: 2023-08-24 | Stop reason: HOSPADM

## 2023-08-21 RX ORDER — SODIUM CHLORIDE 0.9 % (FLUSH) 0.9 %
5-40 SYRINGE (ML) INJECTION EVERY 12 HOURS SCHEDULED
Status: DISCONTINUED | OUTPATIENT
Start: 2023-08-21 | End: 2023-08-24 | Stop reason: HOSPADM

## 2023-08-21 RX ORDER — LORAZEPAM 2 MG/ML
2 INJECTION INTRAMUSCULAR
Status: DISCONTINUED | OUTPATIENT
Start: 2023-08-21 | End: 2023-08-24 | Stop reason: HOSPADM

## 2023-08-21 RX ORDER — SODIUM CHLORIDE 0.9 % (FLUSH) 0.9 %
5-40 SYRINGE (ML) INJECTION PRN
Status: DISCONTINUED | OUTPATIENT
Start: 2023-08-21 | End: 2023-08-24 | Stop reason: HOSPADM

## 2023-08-21 RX ORDER — LORAZEPAM 2 MG/ML
1 INJECTION INTRAMUSCULAR
Status: DISCONTINUED | OUTPATIENT
Start: 2023-08-21 | End: 2023-08-24 | Stop reason: HOSPADM

## 2023-08-21 RX ORDER — SODIUM CHLORIDE 9 MG/ML
INJECTION, SOLUTION INTRAVENOUS PRN
Status: DISCONTINUED | OUTPATIENT
Start: 2023-08-21 | End: 2023-08-24 | Stop reason: HOSPADM

## 2023-08-21 RX ORDER — ONDANSETRON 2 MG/ML
4 INJECTION INTRAMUSCULAR; INTRAVENOUS EVERY 6 HOURS PRN
Status: DISCONTINUED | OUTPATIENT
Start: 2023-08-21 | End: 2023-08-24 | Stop reason: HOSPADM

## 2023-08-21 RX ORDER — LORAZEPAM 2 MG/ML
3 INJECTION INTRAMUSCULAR
Status: DISCONTINUED | OUTPATIENT
Start: 2023-08-21 | End: 2023-08-24 | Stop reason: HOSPADM

## 2023-08-21 RX ORDER — LORAZEPAM 1 MG/1
2 TABLET ORAL
Status: DISCONTINUED | OUTPATIENT
Start: 2023-08-21 | End: 2023-08-24 | Stop reason: HOSPADM

## 2023-08-21 RX ORDER — LORAZEPAM 2 MG/ML
4 INJECTION INTRAMUSCULAR
Status: DISCONTINUED | OUTPATIENT
Start: 2023-08-21 | End: 2023-08-24 | Stop reason: HOSPADM

## 2023-08-21 RX ORDER — TAMSULOSIN HYDROCHLORIDE 0.4 MG/1
0.4 CAPSULE ORAL DAILY
Qty: 14 CAPSULE | Refills: 0 | Status: SHIPPED | OUTPATIENT
Start: 2023-08-21 | End: 2023-08-21 | Stop reason: ALTCHOICE

## 2023-08-21 RX ORDER — LORAZEPAM 1 MG/1
1 TABLET ORAL
Status: DISCONTINUED | OUTPATIENT
Start: 2023-08-21 | End: 2023-08-24 | Stop reason: HOSPADM

## 2023-08-21 RX ORDER — LORAZEPAM 1 MG/1
4 TABLET ORAL
Status: DISCONTINUED | OUTPATIENT
Start: 2023-08-21 | End: 2023-08-24 | Stop reason: HOSPADM

## 2023-08-21 RX ORDER — LANOLIN ALCOHOL/MO/W.PET/CERES
1000 CREAM (GRAM) TOPICAL DAILY
Status: DISCONTINUED | OUTPATIENT
Start: 2023-08-22 | End: 2023-08-24 | Stop reason: HOSPADM

## 2023-08-21 RX ORDER — THIAMINE HYDROCHLORIDE 100 MG/ML
100 INJECTION, SOLUTION INTRAMUSCULAR; INTRAVENOUS DAILY
Status: DISCONTINUED | OUTPATIENT
Start: 2023-08-22 | End: 2023-08-24 | Stop reason: HOSPADM

## 2023-08-21 RX ORDER — ONDANSETRON 4 MG/1
4 TABLET, ORALLY DISINTEGRATING ORAL EVERY 8 HOURS PRN
Status: DISCONTINUED | OUTPATIENT
Start: 2023-08-21 | End: 2023-08-24 | Stop reason: HOSPADM

## 2023-08-21 RX ORDER — ACETAMINOPHEN 325 MG/1
650 TABLET ORAL EVERY 6 HOURS PRN
Status: DISCONTINUED | OUTPATIENT
Start: 2023-08-21 | End: 2023-08-21

## 2023-08-21 RX ADMIN — PANTOPRAZOLE SODIUM 40 MG: 40 INJECTION, POWDER, FOR SOLUTION INTRAVENOUS at 23:45

## 2023-08-21 RX ADMIN — IOPAMIDOL 100 ML: 755 INJECTION, SOLUTION INTRAVENOUS at 20:12

## 2023-08-21 RX ADMIN — SODIUM CHLORIDE, PRESERVATIVE FREE 10 ML: 5 INJECTION INTRAVENOUS at 23:46

## 2023-08-21 RX ADMIN — SODIUM CHLORIDE, PRESERVATIVE FREE 10 ML: 5 INJECTION INTRAVENOUS at 23:45

## 2023-08-21 ASSESSMENT — PAIN SCALES - GENERAL
PAINLEVEL_OUTOF10: 0
PAINLEVEL_OUTOF10: 0

## 2023-08-21 ASSESSMENT — LIFESTYLE VARIABLES
HOW OFTEN DO YOU HAVE A DRINK CONTAINING ALCOHOL: NEVER
HOW MANY STANDARD DRINKS CONTAINING ALCOHOL DO YOU HAVE ON A TYPICAL DAY: PATIENT DOES NOT DRINK

## 2023-08-21 ASSESSMENT — PAIN - FUNCTIONAL ASSESSMENT: PAIN_FUNCTIONAL_ASSESSMENT: 0-10

## 2023-08-21 NOTE — CONSENT
Informed Consent for Blood Component Transfusion Note    I have discussed with the patient the rationale for blood component transfusion; its benefits in treating or preventing fatigue, organ damage, or death; and its risk which includes mild transfusion reactions, rare risk of blood borne infection, or more serious but rare reactions. I have discussed the alternatives to transfusion, including the risk and consequences of not receiving transfusion. The patient had an opportunity to ask questions and had agreed to proceed with transfusion of blood components.     Electronically signed by Ruben Berger PA-C on 8/21/23 at 7:05 PM EDT

## 2023-08-21 NOTE — ED TRIAGE NOTES
Pt presents with difficulty urinating xmonths reports decreased stream   (-)abd pain or flank pain, fever  (+)intermittent dysuria  A&ox4

## 2023-08-21 NOTE — ED NOTES
Hemoccult test negative. Performed by Mary Alice Carpenter.  Jose Harris, RN chaperone      José Gold RN  08/21/23 4504 Patient requested zofran for home .  Dr. Rene was notified .  Voice message left.

## 2023-08-22 ENCOUNTER — ANESTHESIA EVENT (OUTPATIENT)
Dept: ENDOSCOPY | Age: 63
End: 2023-08-22

## 2023-08-22 PROBLEM — D50.9 IRON DEFICIENCY ANEMIA: Status: ACTIVE | Noted: 2023-08-21

## 2023-08-22 PROBLEM — S30.851A: Status: ACTIVE | Noted: 2023-08-22

## 2023-08-22 LAB
ALBUMIN SERPL-MCNC: 3.7 G/DL (ref 3.2–4.6)
ALBUMIN/GLOB SERPL: 1 (ref 0.4–1.6)
ALP SERPL-CCNC: 54 U/L (ref 50–136)
ALT SERPL-CCNC: 14 U/L (ref 12–65)
ANION GAP SERPL CALC-SCNC: 7 MMOL/L (ref 2–11)
AST SERPL-CCNC: 16 U/L (ref 15–37)
BASOPHILS # BLD: 0.1 K/UL (ref 0–0.2)
BASOPHILS NFR BLD: 1 % (ref 0–2)
BILIRUB SERPL-MCNC: 0.8 MG/DL (ref 0.2–1.1)
BUN SERPL-MCNC: 16 MG/DL (ref 8–23)
CALCIUM SERPL-MCNC: 9.4 MG/DL (ref 8.3–10.4)
CHLORIDE SERPL-SCNC: 111 MMOL/L (ref 101–110)
CO2 SERPL-SCNC: 25 MMOL/L (ref 21–32)
CREAT SERPL-MCNC: 1.2 MG/DL (ref 0.8–1.5)
DIFFERENTIAL METHOD BLD: ABNORMAL
EOSINOPHIL # BLD: 0.1 K/UL (ref 0–0.8)
EOSINOPHIL NFR BLD: 2 % (ref 0.5–7.8)
ERYTHROCYTE [DISTWIDTH] IN BLOOD BY AUTOMATED COUNT: 25.4 % (ref 11.9–14.6)
GLOBULIN SER CALC-MCNC: 3.7 G/DL (ref 2.8–4.5)
GLUCOSE SERPL-MCNC: 73 MG/DL (ref 65–100)
HAPTOGLOB SERPL-MCNC: 253 MG/DL (ref 30–200)
HCT VFR BLD AUTO: 23.3 % (ref 41.1–50.3)
HCT VFR BLD AUTO: 23.9 % (ref 41.1–50.3)
HCT VFR BLD AUTO: 24.1 % (ref 41.1–50.3)
HCT VFR BLD AUTO: 27.9 % (ref 41.1–50.3)
HCT VFR BLD AUTO: 28.3 % (ref 41.1–50.3)
HGB BLD-MCNC: 6.7 G/DL (ref 13.6–17.2)
HGB BLD-MCNC: 7 G/DL (ref 13.6–17.2)
HGB BLD-MCNC: 7 G/DL (ref 13.6–17.2)
HGB BLD-MCNC: 8.4 G/DL (ref 13.6–17.2)
HGB BLD-MCNC: 8.6 G/DL (ref 13.6–17.2)
HISTORY CHECK: NORMAL
IMM GRANULOCYTES # BLD AUTO: 0 K/UL (ref 0–0.5)
IMM GRANULOCYTES NFR BLD AUTO: 0 % (ref 0–5)
LYMPHOCYTES # BLD: 1.4 K/UL (ref 0.5–4.6)
LYMPHOCYTES NFR BLD: 15 % (ref 13–44)
MCH RBC QN AUTO: 18.2 PG (ref 26.1–32.9)
MCHC RBC AUTO-ENTMCNC: 28.8 G/DL (ref 31.4–35)
MCV RBC AUTO: 63.3 FL (ref 82–102)
MONOCYTES # BLD: 1.2 K/UL (ref 0.1–1.3)
MONOCYTES NFR BLD: 13 % (ref 4–12)
NEUTS SEG # BLD: 6.2 K/UL (ref 1.7–8.2)
NEUTS SEG NFR BLD: 69 % (ref 43–78)
NRBC # BLD: 0.02 K/UL (ref 0–0.2)
PATH REV BLD -IMP: NORMAL
PLATELET # BLD AUTO: 192 K/UL (ref 150–450)
PMV BLD AUTO: ABNORMAL FL (ref 9.4–12.3)
POTASSIUM SERPL-SCNC: 4.1 MMOL/L (ref 3.5–5.1)
PROT SERPL-MCNC: 7.4 G/DL (ref 6.3–8.2)
RBC # BLD AUTO: 3.68 M/UL (ref 4.23–5.6)
SODIUM SERPL-SCNC: 143 MMOL/L (ref 133–143)
WBC # BLD AUTO: 9.1 K/UL (ref 4.3–11.1)

## 2023-08-22 PROCEDURE — P9016 RBC LEUKOCYTES REDUCED: HCPCS

## 2023-08-22 PROCEDURE — 85018 HEMOGLOBIN: CPT

## 2023-08-22 PROCEDURE — 36415 COLL VENOUS BLD VENIPUNCTURE: CPT

## 2023-08-22 PROCEDURE — 85025 COMPLETE CBC W/AUTO DIFF WBC: CPT

## 2023-08-22 PROCEDURE — C9113 INJ PANTOPRAZOLE SODIUM, VIA: HCPCS | Performed by: INTERNAL MEDICINE

## 2023-08-22 PROCEDURE — 36430 TRANSFUSION BLD/BLD COMPNT: CPT

## 2023-08-22 PROCEDURE — 80053 COMPREHEN METABOLIC PANEL: CPT

## 2023-08-22 PROCEDURE — 2580000003 HC RX 258: Performed by: INTERNAL MEDICINE

## 2023-08-22 PROCEDURE — 6370000000 HC RX 637 (ALT 250 FOR IP): Performed by: PHYSICIAN ASSISTANT

## 2023-08-22 PROCEDURE — 6360000002 HC RX W HCPCS: Performed by: INTERNAL MEDICINE

## 2023-08-22 PROCEDURE — 85014 HEMATOCRIT: CPT

## 2023-08-22 PROCEDURE — 1100000000 HC RM PRIVATE

## 2023-08-22 PROCEDURE — A4216 STERILE WATER/SALINE, 10 ML: HCPCS | Performed by: INTERNAL MEDICINE

## 2023-08-22 PROCEDURE — 6370000000 HC RX 637 (ALT 250 FOR IP): Performed by: INTERNAL MEDICINE

## 2023-08-22 RX ORDER — SODIUM CHLORIDE 9 MG/ML
INJECTION, SOLUTION INTRAVENOUS PRN
Status: DISCONTINUED | OUTPATIENT
Start: 2023-08-22 | End: 2023-08-24 | Stop reason: HOSPADM

## 2023-08-22 RX ORDER — ASCORBIC ACID 500 MG
500 TABLET ORAL DAILY
Status: DISCONTINUED | OUTPATIENT
Start: 2023-08-22 | End: 2023-08-24 | Stop reason: HOSPADM

## 2023-08-22 RX ORDER — FERROUS SULFATE 325(65) MG
325 TABLET ORAL 2 TIMES DAILY WITH MEALS
Status: DISCONTINUED | OUTPATIENT
Start: 2023-08-22 | End: 2023-08-24 | Stop reason: HOSPADM

## 2023-08-22 RX ADMIN — FERROUS SULFATE TAB 325 MG (65 MG ELEMENTAL FE) 325 MG: 325 (65 FE) TAB at 18:07

## 2023-08-22 RX ADMIN — SODIUM CHLORIDE, PRESERVATIVE FREE 10 ML: 5 INJECTION INTRAVENOUS at 21:39

## 2023-08-22 RX ADMIN — FOLIC ACID 1 MG: 1 TABLET ORAL at 09:44

## 2023-08-22 RX ADMIN — OXYCODONE HYDROCHLORIDE AND ACETAMINOPHEN 500 MG: 500 TABLET ORAL at 14:40

## 2023-08-22 RX ADMIN — SODIUM CHLORIDE, PRESERVATIVE FREE 5 ML: 5 INJECTION INTRAVENOUS at 12:32

## 2023-08-22 RX ADMIN — SODIUM CHLORIDE, PRESERVATIVE FREE 10 ML: 5 INJECTION INTRAVENOUS at 09:46

## 2023-08-22 RX ADMIN — PANTOPRAZOLE SODIUM 40 MG: 40 INJECTION, POWDER, FOR SOLUTION INTRAVENOUS at 14:40

## 2023-08-22 RX ADMIN — THIAMINE HYDROCHLORIDE 100 MG: 100 INJECTION, SOLUTION INTRAMUSCULAR; INTRAVENOUS at 09:45

## 2023-08-22 RX ADMIN — CYANOCOBALAMIN TAB 1000 MCG 1000 MCG: 1000 TAB at 09:45

## 2023-08-22 ASSESSMENT — PAIN SCALES - GENERAL: PAINLEVEL_OUTOF10: 0

## 2023-08-22 NOTE — PROGRESS NOTES
Hospitalist Progress Note   Admit Date:  2023  1:22 PM   Name:  Ammy Orozco   Age:  61 y.o. Sex:  male  :  1960   MRN:  [id number]   Room:      Presenting/Chief Complaint: Urinary Hesitancy     Reason(s) for Admission: Urinary retention [R33.9]  Urinary hesitancy [R39.11]  Anemia, unspecified type [D64.9]  ABLA (acute blood loss anemia) [D62]     Hospital Course:   Ammy Orozco is a 61 y.o. male with medical history of alcohol abuse and prior upper GI bleed admitted 23 with several week history of a weak urinary stream and generalized fatigue with dyspnea on exertion. Initial work-up revealed bladder scan with about 350 cc of urine in his bladder. Hgb of 6.2 (baseline is about 7-8). CT abd/pelvis shows fatty liver with urinary retention and retained metallic foreign body. 1 unit pRBC ordered. Hgb corrected only slightly so additional unit ordered. Hospitalist consulted for admission. GI consulted for evaluation. He was found to be iron deficient with elevated TIBC and ferritin of 8. Started on IV iron and vitamin C    Subjective & 24hr Events:   Feels okay today, a little improved. HGB 6.7 this AM agrees to another unit of blood. Denies lightheadedness, dizziness, chest pain, SOB, overt blood loss, No abdominal pain or nausea. Denies hallucinations/delusions, tremors. 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 Will make NPO at MN  - transfuse additional Unit PRBC  - IV PPI  - IV iron + oral vitamin C  - monitor hemodynamics - currently stable  - no elevation in BUN: Cr  - check H&H q6 hours throughout night and transfuse if HGB < 7      Alcohol dependency  - Likely contributing to his mineral deficiency  - CIWA protocol with vitamins/minerals and symptom triggered benzos  - He was counseled on alcohol cessation.       Metal foreign body in

## 2023-08-22 NOTE — ED NOTES
TRANSFER - OUT REPORT:    Verbal report given to [facility] on Jacquie Wyatt  being transferred to [facility] for routine progression of patient care       Report consisted of patient's Situation, Background, Assessment and   Recommendations(SBAR). Information from the following report(s) ED SBAR was reviewed with the receiving nurse. Lines:   Peripheral IV 08/21/23 Left;Posterior;Proximal Forearm (Active)   Site Assessment Clean, dry & intact 08/21/23 1844   Line Status Blood return noted 08/21/23 1844   Phlebitis Assessment No symptoms 08/21/23 1844   Infiltration Assessment 0 08/21/23 1844        Opportunity for questions and clarification was provided.       Patient transported with:  Luis Fernando Dickinson RN  08/21/23 2007

## 2023-08-22 NOTE — H&P
Hospitalist History and Physical   Admit Date:  2023  1:22 PM   Name:  Jose Wolf   Age:  61 y.o. Sex:  male  :  1960   MRN:  [id number]   Room:      Presenting/Chief Complaint: Urinary Hesitancy     Reason(s) for Admission: Urinary retention [R33.9]  Urinary hesitancy [R39.11]  Anemia, unspecified type [D64.9]  ABLA (acute blood loss anemia) [D62]     History of Present Illness:   Jose Wolf is a 61 y.o. male with medical history of alcohol abuse (drinks one pint of alcohol per day) and prior upper GI bleed (angiodysplasia on prior EGD from ) presents with a several week history of a weak urinary stream and generalized fatigue with dyspnea on exertion. No chest pain. Last drink of alcohol was about 24 hours ago. Has not noticed red or black stools. Denies abdominal pain. No diarrhea. ED course: bladder scan with about 350 cc of urine in his bladder. Hgb of 6.2 (baseline is about 7-8. MCV of 60 with elevated RDW. WBC count normal. Mildly elevated AST. Type/screen and consented for blood. CT abd/pelvis shows fatty liver with urinary retention and retained metallic foreign body. 1 unit pRBC ordered. Hospitalist consulted for admission. 10 point ROS negative except for HPI above. Assessment & Plan:     Principal Problem:    ABLA (acute blood loss anemia)  - concern for upper GI bleeding  - check anemia labs (iron studies, B12/folate, hapto/LDH, retic count)  - transfuse 1 unit pRBC now  - trend Hgb/Hct serially  - transfuse for Hgb<7 and/or brisk bleeding  - PPI IV BID  - consult to GI in the morning  - NPO after MN    # Alcohol abuse/dependency  - last drink ~24 hours ago  - [facility] protocol  - thiamine/folate/B12 ordered  - trend LFTs  - cessation counseling provided  - avoid APAP    PT/OT evals and PPD needed/ordered? No  Diet: ADULT DIET;  Regular  Diet NPO  VTE prophylaxis: SCD's   Code status: Full Code    Non-peripheral Lines and Tubes (if present):             TIFFANY TINSLEY NO - HUMACAO

## 2023-08-22 NOTE — PROGRESS NOTES
TRANSFER - IN REPORT:    Verbal report received from [address] on [facility]  being received from ED for routine progression of patient care      Report consisted of patient's Situation, Background, Assessment and   Recommendations(SBAR). Information from the following report(s) Nurse Handoff Report was reviewed with the receiving nurse. Opportunity for questions and clarification was provided. Assessment completed upon patient's arrival to unit and care assumed.

## 2023-08-22 NOTE — CONSULTS
Consult Note            Date:8/22/2023        Patient Name:Selvin Sal     YOB: 1960     Age:63 y.o. Reason for consult: Symptomatic anemia    History of Present Illness   This is a 61-year-old male with a history of alcohol use and prior known small bowel and gastric AVMs. He presented overnight with a hemoglobin of 6.7. His baseline is typically near 7-8 range. CT scan showed an incidental left rectus abdominis metallic lesion. GI was unremarkable on this exam. He denies dark stools. No hematemesis. Denies NSAID use. He reports an EGD last year for similar cause. He is unclear of the results. I cannot locate them in our system. At home he is supposed to be on iron replacement therapy but he does not take this regularly. He does report history of constipation symptoms intermittently. He is compliant with appropriate tonics at home. He denies any known history of liver disease or cirrhosis. He denies any surgical history. He denies any known metallic lesions to his left rectus abdominis area. Social history is positive for tobacco and 3-4 shots of vodka a day. No known family history of malignancy. He denies dark stools. Past Medical History     Past Medical History:   Diagnosis Date    Acute pancreatitis 6/10/2021        Past Surgical History     History reviewed. No pertinent surgical history. Medications     Prior to Admission medications    Medication Sig Start Date End Date Taking?  Authorizing Provider   ferrous sulfate (FE TABS 325) 325 (65 Fe) MG EC tablet Take 325 mg by mouth every morning (before breakfast)  Patient not taking: Reported on 8/22/2023 7/22/20   Ar Automatic Reconciliation   folic acid (FOLVITE) 1 MG tablet Take 1 mg by mouth daily  Patient not taking: Reported on 8/22/2023 7/23/20   Ar Automatic Reconciliation   pantoprazole (PROTONIX) 40 MG tablet Take 40 mg by mouth daily  Patient not taking: Reported on 8/22/2023 7/22/20   Ar Automatic range.  CT scan showed an incidental left rectus abdominis metallic lesion. GI was unremarkable on this exam. He denies dark stools. No hematemesis. Denies NSAID use. Proceed with a diagnostic push enteroscopy once blood products have been received. His hemoglobin is currently 6.7. Given his inability to completely tolerate oral iron, I would recommend IV iron replacement. His ferritin is 8. He was counseled on alcohol cessation. Recommend evaluation of the incidental left rectus abdominis metallic lesion found on imaging. Plan for diagnostic enteroscopy tomorrow as long as hemoglobin is at target range.

## 2023-08-22 NOTE — PROGRESS NOTES
Patient's hgb was 7.0. Writer notified MD on call. Currently awaiting response. Patient is awake, in bed. No SOB and comfortable. No other needs at this time.

## 2023-08-22 NOTE — CARE COORDINATION
RNCM met with patient patient in room 217 to discuss discharge planning. Patient lives with sister in 2nd floor apartment with 12 steps for entry. Patient is independent at baseline. Patient has no current home care services or DME. Family provides transportation. Demographics verified. Patient does not have PCP and is uninsured. CM following.       08/22/23 1412   Service Assessment   Patient Orientation Alert and Oriented   Cognition Alert   History Provided By Patient   Primary [address] [place] Family Members   Patient's Healthcare Decision Maker is: Legal Next of [address] [place] [address]   PCP Verified by CM Yes  (No PCP)   Prior Functional Level Independent in ADLs/IADLs   Current Functional Level Independent in ADLs/IADLs   Can patient return to prior living arrangement Yes   Ability to make needs known: Good   Family able to assist with home care needs: Yes   Would you like for me to discuss the discharge plan with any other family members/significant others, and if so, who? No   Financial Resources None   Community Resources None   Social/Functional History   Lives With Family   Type of Home Apartment   Home Access Stairs to enter with rails   Entrance Stairs - Number of Steps 12   Home Equipment None   Receives Help From Family   ADL Assistance Independent   Ambulation Assistance Independent   Transfer Assistance Independent   Active  No   Mode of Transportation Family   Discharge Planning   Living Arrangements Family Members   Current Services Prior To Admission None   Potential Assistance Needed N/A   DME Ordered?  No   Potential Assistance Purchasing Medications No   Type of Home Care Services None   Patient expects to be discharged to: Apartment   History of falls? 0

## 2023-08-23 ENCOUNTER — ANESTHESIA (OUTPATIENT)
Dept: ENDOSCOPY | Age: 63
End: 2023-08-23

## 2023-08-23 LAB
ABO + RH BLD: NORMAL
ANION GAP SERPL CALC-SCNC: 7 MMOL/L (ref 2–11)
BLD PROD TYP BPU: NORMAL
BLD PROD TYP BPU: NORMAL
BLOOD BANK DISPENSE STATUS: NORMAL
BLOOD BANK DISPENSE STATUS: NORMAL
BLOOD GROUP ANTIBODIES SERPL: NORMAL
BPU ID: NORMAL
BPU ID: NORMAL
BUN SERPL-MCNC: 15 MG/DL (ref 8–23)
CALCIUM SERPL-MCNC: 8.9 MG/DL (ref 8.3–10.4)
CHLORIDE SERPL-SCNC: 111 MMOL/L (ref 101–110)
CO2 SERPL-SCNC: 23 MMOL/L (ref 21–32)
CREAT SERPL-MCNC: 1.4 MG/DL (ref 0.8–1.5)
CROSSMATCH RESULT: NORMAL
CROSSMATCH RESULT: NORMAL
ERYTHROCYTE [DISTWIDTH] IN BLOOD BY AUTOMATED COUNT: 26.9 % (ref 11.9–14.6)
GLUCOSE SERPL-MCNC: 101 MG/DL (ref 65–100)
HCT VFR BLD AUTO: 27.4 % (ref 41.1–50.3)
HGB BLD-MCNC: 8.2 G/DL (ref 13.6–17.2)
MCH RBC QN AUTO: 19.6 PG (ref 26.1–32.9)
MCHC RBC AUTO-ENTMCNC: 29.9 G/DL (ref 31.4–35)
MCV RBC AUTO: 65.4 FL (ref 82–102)
NRBC # BLD: 0.03 K/UL (ref 0–0.2)
PLATELET # BLD AUTO: 172 K/UL (ref 150–450)
PMV BLD AUTO: ABNORMAL FL (ref 9.4–12.3)
POTASSIUM SERPL-SCNC: 3.8 MMOL/L (ref 3.5–5.1)
RBC # BLD AUTO: 4.19 M/UL (ref 4.23–5.6)
SODIUM SERPL-SCNC: 141 MMOL/L (ref 133–143)
SPECIMEN EXP DATE BLD: NORMAL
UNIT DIVISION: 0
UNIT DIVISION: 0
WBC # BLD AUTO: 8.3 K/UL (ref 4.3–11.1)

## 2023-08-23 PROCEDURE — 0DB58ZX EXCISION OF ESOPHAGUS, VIA NATURAL OR ARTIFICIAL OPENING ENDOSCOPIC, DIAGNOSTIC: ICD-10-PCS | Performed by: INTERNAL MEDICINE

## 2023-08-23 PROCEDURE — 7100000010 HC PHASE II RECOVERY - FIRST 15 MIN: Performed by: INTERNAL MEDICINE

## 2023-08-23 PROCEDURE — 6360000002 HC RX W HCPCS: Performed by: NURSE ANESTHETIST, CERTIFIED REGISTERED

## 2023-08-23 PROCEDURE — 3609013900 HC ENTEROSCOPY: Performed by: INTERNAL MEDICINE

## 2023-08-23 PROCEDURE — 85027 COMPLETE CBC AUTOMATED: CPT

## 2023-08-23 PROCEDURE — C9113 INJ PANTOPRAZOLE SODIUM, VIA: HCPCS | Performed by: INTERNAL MEDICINE

## 2023-08-23 PROCEDURE — 2720000010 HC SURG SUPPLY STERILE: Performed by: INTERNAL MEDICINE

## 2023-08-23 PROCEDURE — 2709999900 HC NON-CHARGEABLE SUPPLY: Performed by: INTERNAL MEDICINE

## 2023-08-23 PROCEDURE — 88312 SPECIAL STAINS GROUP 1: CPT

## 2023-08-23 PROCEDURE — 2580000003 HC RX 258: Performed by: INTERNAL MEDICINE

## 2023-08-23 PROCEDURE — 3700000000 HC ANESTHESIA ATTENDED CARE: Performed by: INTERNAL MEDICINE

## 2023-08-23 PROCEDURE — 6370000000 HC RX 637 (ALT 250 FOR IP): Performed by: PHYSICIAN ASSISTANT

## 2023-08-23 PROCEDURE — 6360000002 HC RX W HCPCS: Performed by: INTERNAL MEDICINE

## 2023-08-23 PROCEDURE — 2500000003 HC RX 250 WO HCPCS: Performed by: NURSE ANESTHETIST, CERTIFIED REGISTERED

## 2023-08-23 PROCEDURE — 1100000000 HC RM PRIVATE

## 2023-08-23 PROCEDURE — 2580000003 HC RX 258: Performed by: NURSE ANESTHETIST, CERTIFIED REGISTERED

## 2023-08-23 PROCEDURE — 7100000011 HC PHASE II RECOVERY - ADDTL 15 MIN: Performed by: INTERNAL MEDICINE

## 2023-08-23 PROCEDURE — 36415 COLL VENOUS BLD VENIPUNCTURE: CPT

## 2023-08-23 PROCEDURE — 6370000000 HC RX 637 (ALT 250 FOR IP): Performed by: INTERNAL MEDICINE

## 2023-08-23 PROCEDURE — A4216 STERILE WATER/SALINE, 10 ML: HCPCS | Performed by: INTERNAL MEDICINE

## 2023-08-23 PROCEDURE — 3700000001 HC ADD 15 MINUTES (ANESTHESIA): Performed by: INTERNAL MEDICINE

## 2023-08-23 PROCEDURE — 2580000003 HC RX 258: Performed by: ANESTHESIOLOGY

## 2023-08-23 PROCEDURE — 88305 TISSUE EXAM BY PATHOLOGIST: CPT

## 2023-08-23 PROCEDURE — 80048 BASIC METABOLIC PNL TOTAL CA: CPT

## 2023-08-23 RX ORDER — SODIUM CHLORIDE 9 MG/ML
INJECTION, SOLUTION INTRAVENOUS PRN
Status: DISCONTINUED | OUTPATIENT
Start: 2023-08-23 | End: 2023-08-23 | Stop reason: HOSPADM

## 2023-08-23 RX ORDER — LIDOCAINE HYDROCHLORIDE 10 MG/ML
1 INJECTION, SOLUTION INFILTRATION; PERINEURAL
Status: DISCONTINUED | OUTPATIENT
Start: 2023-08-23 | End: 2023-08-23 | Stop reason: HOSPADM

## 2023-08-23 RX ORDER — SODIUM CHLORIDE 0.9 % (FLUSH) 0.9 %
5-40 SYRINGE (ML) INJECTION PRN
Status: DISCONTINUED | OUTPATIENT
Start: 2023-08-23 | End: 2023-08-23 | Stop reason: HOSPADM

## 2023-08-23 RX ORDER — LIDOCAINE HYDROCHLORIDE 20 MG/ML
INJECTION, SOLUTION EPIDURAL; INFILTRATION; INTRACAUDAL; PERINEURAL PRN
Status: DISCONTINUED | OUTPATIENT
Start: 2023-08-23 | End: 2023-08-23 | Stop reason: SDUPTHER

## 2023-08-23 RX ORDER — SODIUM CHLORIDE, SODIUM LACTATE, POTASSIUM CHLORIDE, CALCIUM CHLORIDE 600; 310; 30; 20 MG/100ML; MG/100ML; MG/100ML; MG/100ML
INJECTION, SOLUTION INTRAVENOUS CONTINUOUS
Status: DISCONTINUED | OUTPATIENT
Start: 2023-08-23 | End: 2023-08-23

## 2023-08-23 RX ORDER — PANTOPRAZOLE SODIUM 40 MG/1
40 TABLET, DELAYED RELEASE ORAL
Status: DISCONTINUED | OUTPATIENT
Start: 2023-08-23 | End: 2023-08-24 | Stop reason: HOSPADM

## 2023-08-23 RX ORDER — ONDANSETRON 2 MG/ML
4 INJECTION INTRAMUSCULAR; INTRAVENOUS
Status: DISCONTINUED | OUTPATIENT
Start: 2023-08-23 | End: 2023-08-23 | Stop reason: HOSPADM

## 2023-08-23 RX ORDER — SODIUM CHLORIDE, SODIUM LACTATE, POTASSIUM CHLORIDE, CALCIUM CHLORIDE 600; 310; 30; 20 MG/100ML; MG/100ML; MG/100ML; MG/100ML
INJECTION, SOLUTION INTRAVENOUS CONTINUOUS
Status: DISCONTINUED | OUTPATIENT
Start: 2023-08-23 | End: 2023-08-24 | Stop reason: HOSPADM

## 2023-08-23 RX ORDER — PROCHLORPERAZINE EDISYLATE 5 MG/ML
5 INJECTION INTRAMUSCULAR; INTRAVENOUS
Status: DISCONTINUED | OUTPATIENT
Start: 2023-08-23 | End: 2023-08-24 | Stop reason: HOSPADM

## 2023-08-23 RX ORDER — SODIUM CHLORIDE 0.9 % (FLUSH) 0.9 %
5-40 SYRINGE (ML) INJECTION EVERY 12 HOURS SCHEDULED
Status: DISCONTINUED | OUTPATIENT
Start: 2023-08-23 | End: 2023-08-23 | Stop reason: HOSPADM

## 2023-08-23 RX ORDER — PROPOFOL 10 MG/ML
INJECTION, EMULSION INTRAVENOUS PRN
Status: DISCONTINUED | OUTPATIENT
Start: 2023-08-23 | End: 2023-08-23 | Stop reason: SDUPTHER

## 2023-08-23 RX ORDER — ONDANSETRON 2 MG/ML
4 INJECTION INTRAMUSCULAR; INTRAVENOUS
Status: DISCONTINUED | OUTPATIENT
Start: 2023-08-23 | End: 2023-08-24 | Stop reason: HOSPADM

## 2023-08-23 RX ADMIN — LIDOCAINE HYDROCHLORIDE 80 MG: 20 INJECTION, SOLUTION EPIDURAL; INFILTRATION; INTRACAUDAL; PERINEURAL at 11:10

## 2023-08-23 RX ADMIN — PROPOFOL 20 MG: 10 INJECTION, EMULSION INTRAVENOUS at 11:14

## 2023-08-23 RX ADMIN — OXYCODONE HYDROCHLORIDE AND ACETAMINOPHEN 500 MG: 500 TABLET ORAL at 07:42

## 2023-08-23 RX ADMIN — PHENYLEPHRINE HYDROCHLORIDE 100 MCG: 10 INJECTION INTRAVENOUS at 11:15

## 2023-08-23 RX ADMIN — PROPOFOL 140 MCG/KG/MIN: 10 INJECTION, EMULSION INTRAVENOUS at 11:11

## 2023-08-23 RX ADMIN — PANTOPRAZOLE SODIUM 40 MG: 40 INJECTION, POWDER, FOR SOLUTION INTRAVENOUS at 00:15

## 2023-08-23 RX ADMIN — SODIUM CHLORIDE, PRESERVATIVE FREE 10 ML: 5 INJECTION INTRAVENOUS at 07:43

## 2023-08-23 RX ADMIN — CYANOCOBALAMIN TAB 1000 MCG 1000 MCG: 1000 TAB at 07:42

## 2023-08-23 RX ADMIN — THIAMINE HYDROCHLORIDE 100 MG: 100 INJECTION, SOLUTION INTRAMUSCULAR; INTRAVENOUS at 07:42

## 2023-08-23 RX ADMIN — PANTOPRAZOLE SODIUM 40 MG: 40 INJECTION, POWDER, FOR SOLUTION INTRAVENOUS at 12:37

## 2023-08-23 RX ADMIN — SODIUM CHLORIDE, POTASSIUM CHLORIDE, SODIUM LACTATE AND CALCIUM CHLORIDE: 600; 310; 30; 20 INJECTION, SOLUTION INTRAVENOUS at 08:56

## 2023-08-23 RX ADMIN — PROPOFOL 80 MG: 10 INJECTION, EMULSION INTRAVENOUS at 11:10

## 2023-08-23 RX ADMIN — FERROUS SULFATE TAB 325 MG (65 MG ELEMENTAL FE) 325 MG: 325 (65 FE) TAB at 07:42

## 2023-08-23 RX ADMIN — PANTOPRAZOLE SODIUM 40 MG: 40 TABLET, DELAYED RELEASE ORAL at 17:10

## 2023-08-23 RX ADMIN — SODIUM CHLORIDE, PRESERVATIVE FREE 10 ML: 5 INJECTION INTRAVENOUS at 21:09

## 2023-08-23 RX ADMIN — FOLIC ACID 1 MG: 1 TABLET ORAL at 07:42

## 2023-08-23 RX ADMIN — FERROUS SULFATE TAB 325 MG (65 MG ELEMENTAL FE) 325 MG: 325 (65 FE) TAB at 17:10

## 2023-08-23 ASSESSMENT — PAIN - FUNCTIONAL ASSESSMENT
PAIN_FUNCTIONAL_ASSESSMENT: NONE - DENIES PAIN

## 2023-08-23 ASSESSMENT — PAIN SCALES - GENERAL
PAINLEVEL_OUTOF10: 0
PAINLEVEL_OUTOF10: 0

## 2023-08-23 ASSESSMENT — LIFESTYLE VARIABLES: SMOKING_STATUS: 1

## 2023-08-23 NOTE — ANESTHESIA POSTPROCEDURE EVALUATION
Department of Anesthesiology  Postprocedure Note    Patient: Ghazala Golden  MRN: [id number]  YOB: 1960  Date of evaluation: 8/23/2023      Procedure Summary     Date: 08/23/23 Room / Location:  ENDO 03 /  ENDOSCOPY    Anesthesia Start: 1107 Anesthesia Stop: 5849    Procedure: ENTEROSCOPY PUSH  with APC and Biopsy (Upper GI Region) Diagnosis:       Iron deficiency anemia      (Iron deficiency anemia [D50.9])    Surgeons: Annamarie Varner MD Responsible Provider: Alis Padgett MD    Anesthesia Type: TIVA ASA Status: 3          Anesthesia Type: No value filed.     Amadeo Phase I: Amadeo Score: 10    Amadeo Phase II: Amadeo Score: 10      Anesthesia Post Evaluation    Patient location during evaluation: PACU  Patient participation: complete - patient participated  Level of consciousness: awake and alert  Pain score: 1  Airway patency: patent  Nausea & Vomiting: no nausea  Complications: no  Cardiovascular status: blood pressure returned to baseline and hemodynamically stable  Respiratory status: acceptable  Hydration status: euvolemic  Multimodal analgesia pain management approach  Pain management: adequate and satisfactory to patient

## 2023-08-23 NOTE — PROGRESS NOTES
TRANSFER - IN REPORT:    Verbal report received from Dilcia Wilson on Jose Ped  being received from  217 for ordered procedure      Report consisted of patient's Situation, Background, Assessment and   Recommendations(SBAR). Information from the following report(s) Procedure Verification was reviewed with the receiving nurse. Opportunity for questions and clarification was provided. Assessment completed upon patient's arrival to unit and care assumed.

## 2023-08-23 NOTE — OP NOTE
Endoscopy Note          Operative Report    Patient: Suzie Shay MRN: [id number]      YOB: 1960  Age: 61 y.o. Sex: male            Indications: Symptomatic anemia. Hemoglobin is 6.7. Preoperative Evaluation: The patient was evaluated prior to the procedure in the GI lab admission area, the patient ASA was recorded . Consent was obtained from the patient with the risk of perforation bleeding and aspiration. Anesthesia: JIMI-per anesthesia    Complication: None; patient tolerated the procedure well. Estimated blood loss: insignificant    Procedure: The patient was sedated into the left lateral decubitus position. Scope was advanced from the mouth to the third portion of duodenum. Scope was withdrawn to the stomach and retroflexed view was performed. Findings:  Normal esophagus. Regular Z-line at 40 cm. The scope was advanced 50cm into the jejunum. There were multiple, tiny non-bleeding AVM's noted. There was one AVM with an adherent clot with recent stigmata of bleeding that was treated successfully with APC. The remainder of the duodenum and jejunum was normal.      The scope was then withdrawn into the stomach and there was an area of mucosal abnormality with friability and contact oozing in the gastric fundus, involving the greater curvature. This area was approximately 6 cm in length and irregular (see pictures). Several cold forcep biopsy passes were obtained for more definitive diagnosis. Given the irregularity and appearance the concern is for malignancy. Postoperative Diagnosis:  Bleeding jejunal AVM status post APC, successful. Flat gastric fundus friable greater curvature lesion concerning for malignancy, pathology is pending. [address] Esophagogastroduodenoscopy, Flexible, transoral; biopsy; single or multiple      Recommendations:   Return patient to [facility] for ongoing care. Okay to resume regular diet.   Okay to resume prior medications however hold

## 2023-08-23 NOTE — PROGRESS NOTES
TRANSFER - OUT REPORT:    Verbal report given to MARIALUISA Wilson on Emerson Lanier  being transferred to  for routine progression of patient care       Report consisted of patient's Situation, Background, Assessment and   Recommendations(SBAR). Information from the following report(s) Procedure Summary, Nurse Handoff Report, Surgery Report, and Recent Results was reviewed with the receiving nurse. Lines:   Peripheral IV 08/21/23 Left;Posterior;Proximal Forearm (Active)   Site Assessment Clean, dry & intact 08/23/23 1151   Line Status Infusing 08/23/23 [address] Connections checked and tightened;Ports disinfected 08/23/23 0750   Phlebitis Assessment No symptoms 08/23/23 1151   Infiltration Assessment 0 08/23/23 1151   Alcohol Cap Used No 08/23/23 1151   Dressing Status Clean, dry & intact 08/23/23 1151   Dressing Type Transparent 08/23/23 1151        Opportunity for questions and clarification was provided.       Patient transported with:  IV, chart

## 2023-08-23 NOTE — PROGRESS NOTES
TRANSFER - OUT REPORT:    Verbal report given to Marti Junior on Breanne Lang  being transferred to GI Lab for ordered procedure       Report consisted of patient's Situation, Background, Assessment and   Recommendations(SBAR). Information from the following report(s) Nurse Handoff Report was reviewed with the receiving nurse. Lines:   Peripheral IV 08/21/23 Left;Posterior;Proximal Forearm (Active)   Site Assessment Clean, dry & intact 08/22/23 2019   Line Status Flushed;Capped 08/22/23 2019   Line Care Connections checked and tightened 08/22/23 2019   Phlebitis Assessment No symptoms 08/22/23 2019   Infiltration Assessment 0 08/22/23 2019   Alcohol Cap Used Yes 08/22/23 2019   Dressing Status Clean, dry & intact 08/22/23 2019   Dressing Type Transparent 08/22/23 2019        Opportunity for questions and clarification was provided.       Patient transported with:  [employer]

## 2023-08-23 NOTE — PROGRESS NOTES
Hospitalist Progress Note   Admit Date:  2023  1:22 PM   Name:  Jose Wolf   Age:  61 y.o. Sex:  male  :  1960   MRN:  [id number]   Room:      Presenting/Chief Complaint: Urinary Hesitancy     Reason(s) for Admission: Urinary retention [R33.9]  Urinary hesitancy [R39.11]  Anemia, unspecified type [D64.9]  ABLA (acute blood loss anemia) [D62]     Hospital Course:   Jose Wolf is a 61 y.o. male with medical history of alcohol abuse and prior upper GI bleed admitted 23 with several week history of a weak urinary stream and generalized fatigue with dyspnea on exertion. Initial work-up revealed bladder scan with about 350 cc of urine in his bladder. Hgb of 6.2 (baseline is about 7-8). CT abd/pelvis shows fatty liver with urinary retention and retained metallic foreign body. 1 unit pRBC ordered. Hgb corrected only slightly so additional unit ordered. Hospitalist consulted for admission. GI consulted for evaluation. He was found to be iron deficient with elevated TIBC and ferritin of 8. Started on IV iron and vitamin C but then pharmacy alerted me to IV iron shortage so started on oral.     Enteroscopy push with Bx on 23 (Dr. Kevin Cullen) showed Bleeding jejunal AVM status post APC and flat gastric fundus friable greater curvature lesion concerning for malignancy - bx taken, path pending. Subjective & 24hr Events:   Doing well post procedure. No pain. No bleeding post procedure. Knows about the possibility of cancer. Resting. Has not needed benzos.  VSS, labs stable - hgb 8.2    Plan is DC in AM if HGB stable    Assessment & Plan:       ABLA from bleeding jejunal AVMs + Iron deficiency anemia  - s/p diagnostic push enteroscopy 23 - GI will follow up results of path  - Oral PPI BID  - oral iron + oral vitamin C  - monitor hemodynamics - currently stable  - no elevation in BUN: Cr  - check CBC in AM       Alcohol dependency  - Likely contributing to his atraumatic  Eyes:  Sclerae appear normal.  Pupils equally round. ENT:  Nares appear normal.  Moist oral mucosa  Neck:  No restricted ROM. Trachea midline   CV:   RRR. No m/r/g. No jugular venous distension. Lungs:   CTAB. No wheezing, rhonchi, or rales. Symmetric expansion. Abdomen:   Soft, nontender, nondistended. Extremities: No cyanosis or clubbing. No edema  Skin:     No rashes and normal coloration. Warm and dry. Neuro:  CN II-XII grossly intact. Psych:  Normal mood and affect.       I have personally reviewed labs and tests:  Recent Labs:  Recent Results (from the past 48 hour(s))   CBC with Auto Differential    Collection Time: 08/21/23  4:42 PM   Result Value Ref Range    WBC 7.0 4.3 - 11.1 K/uL    RBC 3.79 (L) 4.23 - 5.6 M/uL    Hemoglobin 6.2 (LL) 13.6 - 17.2 g/dL    Hematocrit 22.8 (L) 41.1 - 50.3 %    MCV 60.2 (L) 82 - 102 FL    MCH 16.4 (L) 26.1 - 32.9 PG    MCHC 27.2 (L) 31.4 - 35.0 g/dL    RDW 22.3 (H) 11.9 - 14.6 %    Platelets 163 554 - 628 K/uL    MPV 9.6 9.4 - 12.3 FL    nRBC 0.00 0.0 - 0.2 K/uL    Differential Type AUTOMATED      Neutrophils % 61 43 - 78 %    Lymphocytes % 24 13 - 44 %    Monocytes % 9 4.0 - 12.0 %    Eosinophils % 4 0.5 - 7.8 %    Basophils % 1 0.0 - 2.0 %    Immature Granulocytes 1 0.0 - 5.0 %    Neutrophils Absolute 4.3 1.7 - 8.2 K/UL    Lymphocytes Absolute 1.6 0.5 - 4.6 K/UL    Monocytes Absolute 0.6 0.1 - 1.3 K/UL    Eosinophils Absolute 0.3 0.0 - 0.8 K/UL    Basophils Absolute 0.1 0.0 - 0.2 K/UL    Absolute Immature Granulocyte 0.0 0.0 - 0.5 K/UL   Reticulocytes    Collection Time: 08/21/23  4:42 PM   Result Value Ref Range    Reticulocyte Count,Automated 1.7 0.3 - 2.0 %    Absolute Retic # 0.0649 0.026 - 0.095 M/ul    Immature Retic Fraction 33.1 (H) 2.3 - 13.4 %    Retic Hemoglobin conc. 18 (L) 29 - 35 pg   PREPARE RBC (CROSSMATCH), 1 Units    Collection Time: 08/21/23  6:15 PM   Result Value Ref Range    History Check Historical check performed    TYPE

## 2023-08-23 NOTE — PROGRESS NOTES
TRANSFER - IN REPORT:    Verbal report received from Dilcia Borrego on Tj Hoyt  being received from GI Lab for routine progression of patient care      Report consisted of patient's Situation, Background, Assessment and   Recommendations(SBAR). Information from the following report(s) Nurse Handoff Report was reviewed with the receiving nurse. Opportunity for questions and clarification was provided. Assessment completed upon patient's arrival to unit and care assumed.

## 2023-08-23 NOTE — PLAN OF CARE
Problem: Discharge Planning  Goal: Discharge to home or other facility with appropriate resources  Outcome: Progressing     Problem: Pain  Goal: Verbalizes/displays adequate comfort level or baseline comfort level  Outcome: Progressing     Problem: Hematologic - Adult  Goal: Maintains hematologic stability  Outcome: Progressing     Problem: Safety - Adult  Goal: Free from fall injury  Outcome: Progressing

## 2023-08-24 VITALS
WEIGHT: 144.6 LBS | TEMPERATURE: 98.8 F | BODY MASS INDEX: 24.69 KG/M2 | OXYGEN SATURATION: 98 % | HEIGHT: 64 IN | DIASTOLIC BLOOD PRESSURE: 73 MMHG | RESPIRATION RATE: 18 BRPM | SYSTOLIC BLOOD PRESSURE: 124 MMHG | HEART RATE: 80 BPM

## 2023-08-24 PROBLEM — D62 ABLA (ACUTE BLOOD LOSS ANEMIA): Status: RESOLVED | Noted: 2023-08-21 | Resolved: 2023-08-24

## 2023-08-24 LAB
ANION GAP SERPL CALC-SCNC: 4 MMOL/L (ref 2–11)
BUN SERPL-MCNC: 12 MG/DL (ref 8–23)
CALCIUM SERPL-MCNC: 8.9 MG/DL (ref 8.3–10.4)
CHLORIDE SERPL-SCNC: 111 MMOL/L (ref 101–110)
CO2 SERPL-SCNC: 26 MMOL/L (ref 21–32)
CREAT SERPL-MCNC: 1.4 MG/DL (ref 0.8–1.5)
ERYTHROCYTE [DISTWIDTH] IN BLOOD BY AUTOMATED COUNT: 27.6 % (ref 11.9–14.6)
GLUCOSE SERPL-MCNC: 109 MG/DL (ref 65–100)
HCT VFR BLD AUTO: 27.2 % (ref 41.1–50.3)
HGB BLD-MCNC: 8.2 G/DL (ref 13.6–17.2)
MCH RBC QN AUTO: 19.8 PG (ref 26.1–32.9)
MCHC RBC AUTO-ENTMCNC: 30.1 G/DL (ref 31.4–35)
MCV RBC AUTO: 65.5 FL (ref 82–102)
NRBC # BLD: 0.03 K/UL (ref 0–0.2)
PLATELET # BLD AUTO: 188 K/UL (ref 150–450)
PMV BLD AUTO: ABNORMAL FL (ref 9.4–12.3)
POTASSIUM SERPL-SCNC: 3.9 MMOL/L (ref 3.5–5.1)
RBC # BLD AUTO: 4.15 M/UL (ref 4.23–5.6)
SODIUM SERPL-SCNC: 141 MMOL/L (ref 133–143)
WBC # BLD AUTO: 12.7 K/UL (ref 4.3–11.1)

## 2023-08-24 PROCEDURE — 6370000000 HC RX 637 (ALT 250 FOR IP): Performed by: INTERNAL MEDICINE

## 2023-08-24 PROCEDURE — 36415 COLL VENOUS BLD VENIPUNCTURE: CPT

## 2023-08-24 PROCEDURE — 85027 COMPLETE CBC AUTOMATED: CPT

## 2023-08-24 PROCEDURE — 80048 BASIC METABOLIC PNL TOTAL CA: CPT

## 2023-08-24 PROCEDURE — 6360000002 HC RX W HCPCS: Performed by: INTERNAL MEDICINE

## 2023-08-24 PROCEDURE — 6370000000 HC RX 637 (ALT 250 FOR IP): Performed by: PHYSICIAN ASSISTANT

## 2023-08-24 PROCEDURE — 2580000003 HC RX 258: Performed by: INTERNAL MEDICINE

## 2023-08-24 RX ORDER — ASCORBIC ACID 500 MG
500 TABLET ORAL DAILY
Qty: 30 TABLET | Refills: 0 | Status: SHIPPED | OUTPATIENT
Start: 2023-08-25 | End: 2023-09-24

## 2023-08-24 RX ORDER — FERROUS SULFATE 325(65) MG
325 TABLET ORAL 2 TIMES DAILY WITH MEALS
Qty: 60 TABLET | Refills: 0 | Status: SHIPPED | OUTPATIENT
Start: 2023-08-24 | End: 2023-09-23

## 2023-08-24 RX ORDER — PANTOPRAZOLE SODIUM 40 MG/1
40 TABLET, DELAYED RELEASE ORAL
Qty: 60 TABLET | Refills: 0 | Status: SHIPPED | OUTPATIENT
Start: 2023-08-24 | End: 2023-09-23

## 2023-08-24 RX ORDER — LANOLIN ALCOHOL/MO/W.PET/CERES
100 CREAM (GRAM) TOPICAL DAILY
Qty: 30 TABLET | Refills: 0 | Status: SHIPPED | OUTPATIENT
Start: 2023-08-24 | End: 2023-09-23

## 2023-08-24 RX ORDER — FOLIC ACID 1 MG/1
1 TABLET ORAL DAILY
Qty: 30 TABLET | Refills: 0 | Status: SHIPPED | OUTPATIENT
Start: 2023-08-24 | End: 2023-09-23

## 2023-08-24 RX ADMIN — CYANOCOBALAMIN TAB 1000 MCG 1000 MCG: 1000 TAB at 08:23

## 2023-08-24 RX ADMIN — FERROUS SULFATE TAB 325 MG (65 MG ELEMENTAL FE) 325 MG: 325 (65 FE) TAB at 08:24

## 2023-08-24 RX ADMIN — PANTOPRAZOLE SODIUM 40 MG: 40 TABLET, DELAYED RELEASE ORAL at 06:00

## 2023-08-24 RX ADMIN — FOLIC ACID 1 MG: 1 TABLET ORAL at 08:23

## 2023-08-24 RX ADMIN — SODIUM CHLORIDE, PRESERVATIVE FREE 10 ML: 5 INJECTION INTRAVENOUS at 08:28

## 2023-08-24 RX ADMIN — THIAMINE HYDROCHLORIDE 100 MG: 100 INJECTION, SOLUTION INTRAMUSCULAR; INTRAVENOUS at 08:25

## 2023-08-24 RX ADMIN — OXYCODONE HYDROCHLORIDE AND ACETAMINOPHEN 500 MG: 500 TABLET ORAL at 08:24

## 2023-08-24 RX ADMIN — SODIUM CHLORIDE, PRESERVATIVE FREE 10 ML: 5 INJECTION INTRAVENOUS at 08:27

## 2023-08-24 NOTE — PROGRESS NOTES
Discharge teaching complete. Patient verbalized understanding of diet, activity, incisional care, S/Sx as reviewed, and follow-up appointment. Rx sent to patient's pharmacy. Discharge instructions given to patient.

## 2023-08-24 NOTE — DISCHARGE INSTRUCTIONS
DISCHARGE SUMMARY from Nurse    PATIENT INSTRUCTIONS:    After general anesthesia or intravenous sedation, for 24 hours or while taking prescription Narcotics:  Limit your activities  Do not drive and operate hazardous machinery  Do not make important personal or business decisions  Do  not drink alcoholic beverages  If you have not urinated within 8 hours after discharge, please contact your surgeon on call. Report the following to your surgeon:  Excessive pain, swelling, redness or odor of or around the surgical area  Temperature over 100.5  Nausea and vomiting lasting longer than 4 hours or if unable to take medications  Any signs of decreased circulation or nerve impairment to extremity: change in color, persistent  numbness, tingling, coldness or increase pain  Any questions    What to do at Home:  Recommended activity: activity as tolerated    If you experience any of the following symptoms fever greater than 101, unrelieved nausea or vomiting, uncontrolled pain or black/tarry stools please follow up with your PCP. *  Please give a list of your current medications to your Primary Care Provider. *  Please update this list whenever your medications are discontinued, doses are      changed, or new medications (including over-the-counter products) are added. *  Please carry medication information at all times in case of emergency situations. These are general instructions for a healthy lifestyle:    No smoking/ No tobacco products/ Avoid exposure to second hand smoke  Surgeon General's Warning:  Quitting smoking now greatly reduces serious risk to your health.     Obesity, smoking, and sedentary lifestyle greatly increases your risk for illness    A healthy diet, regular physical exercise & weight monitoring are important for maintaining a healthy lifestyle    You may be retaining fluid if you have a history of heart failure or if you experience any of the following symptoms:  Weight gain of 3 cancer. It may also be caused by medicines such as aspirin or ibuprofen. Light bleeding may not cause any symptoms at first. But if you continue to bleed for a while, you may feel very weak or tired. Sudden, heavy bleeding means you need to see a doctor right away. This kind of bleeding can be very dangerous. But it can usually be cured or controlled. The doctor may do some tests to find the cause of your bleeding. Follow-up care is a key part of your treatment and safety. Be sure to make and go to all appointments, and call your doctor if you are having problems. It's also a good idea to know your test results and keep a list of the medicines you take. How can you care for yourself at home? Be safe with medicines. Take your medicines exactly as prescribed. Call your doctor if you think you are having a problem with your medicine. You will get more details on the specific medicines your doctor prescribes. Do not take aspirin or other anti-inflammatory medicines, such as naproxen (Aleve) or ibuprofen (Advil, Motrin), without talking to your doctor first. Ask your doctor if it is okay to use acetaminophen (Tylenol). Do not drink alcohol. The bleeding may make you lose iron. So it's important to eat foods that have a lot of iron. These include red meat, shellfish, poultry, and eggs. They also include beans, raisins, whole-grain breads, and leafy green vegetables. If you want help planning meals, you can make an appointment with a dietitian. When should you call for help? Call 911 anytime you think you may need emergency care. For example, call if:    You have sudden, severe belly pain. You vomit blood or what looks like coffee grounds. You passed out (lost consciousness). Your stools are maroon or very bloody. Call your doctor now or seek immediate medical care if:    You are dizzy or lightheaded, or you feel like you may faint.      Your stools are black and look like tar, or they have streaks

## 2023-08-24 NOTE — CARE COORDINATION
Patient with discharge order for today. [facility] referral made for appointment at [facility] for hospital follow up. Patient has met all treatment goals and milestones for discharge. Patient/family in agreement with discharge plan. Family to provide transportation home. CM following until patient is discharged. 08/24/23 1023   Service Assessment   Patient Orientation Alert and [address] Discharge   Transition of Care Consult (CM Consult) Discharge Planning   Services [facility] Provided? No   Mode of Transport at Discharge Self   Confirm Follow Up Transport Self   Condition of Participation: Discharge Planning   The Plan for Transition of Care is related to the following treatment goals: Return to baseline   The Patient and/or Patient Representative was provided with a Choice of Provider? Patient   The Patient and/Or Patient Representative agree with the Discharge Plan? Yes   Freedom of Choice list was provided with basic dialogue that supports the patient's individualized plan of care/goals, treatment preferences, and shares the quality data associated with the providers?   Yes

## 2023-08-25 ENCOUNTER — TELEPHONE (OUTPATIENT)
Dept: GASTROENTEROLOGY | Age: 63
End: 2023-08-25

## 2023-08-25 NOTE — TELEPHONE ENCOUNTER
Raman Rae,    Please try contacting the patient or sending a letter to inform him that his gastric biopsies did not reveal cancer (please save this specifically). There was only evidence of nonspecific inflammation. I have tried to reach him several times via telephone and have been unsuccessful. Can you also arrange for a follow-up EGD in 12 weeks to check the previous site that was irregular and concerning (which biopsies revealed gastritis). I would like him to continue a PPI daily. Thank you.

## 2023-09-06 ENCOUNTER — TELEPHONE (OUTPATIENT)
Dept: GASTROENTEROLOGY | Age: 63
End: 2023-09-06

## 2023-09-06 NOTE — TELEPHONE ENCOUNTER
Called patient to schedule a hospital follow up to discuss symptoms/repeat EGD as per Dr Andrew Nogueira requested. No answer and voice mail is full.

## 2023-09-06 NOTE — TELEPHONE ENCOUNTER
Called alternate number to try to reach patient. Received number disconnected or is no longer in service recording.
